# Patient Record
Sex: MALE | Race: BLACK OR AFRICAN AMERICAN | Employment: OTHER | URBAN - METROPOLITAN AREA
[De-identification: names, ages, dates, MRNs, and addresses within clinical notes are randomized per-mention and may not be internally consistent; named-entity substitution may affect disease eponyms.]

---

## 2017-10-15 ENCOUNTER — HOSPITAL ENCOUNTER (EMERGENCY)
Age: 37
Discharge: HOME | End: 2017-10-15
Attending: EMERGENCY MEDICINE
Payer: SELF-PAY

## 2017-10-15 ENCOUNTER — APPOINTMENT (OUTPATIENT)
Dept: ULTRASOUND IMAGING | Age: 37
End: 2017-10-15
Attending: EMERGENCY MEDICINE
Payer: SELF-PAY

## 2017-10-15 VITALS
SYSTOLIC BLOOD PRESSURE: 213 MMHG | HEIGHT: 71 IN | WEIGHT: 266.1 LBS | HEART RATE: 94 BPM | RESPIRATION RATE: 16 BRPM | OXYGEN SATURATION: 99 % | DIASTOLIC BLOOD PRESSURE: 126 MMHG | BODY MASS INDEX: 37.25 KG/M2

## 2017-10-15 DIAGNOSIS — N50.811 RIGHT TESTICULAR PAIN: Primary | ICD-10-CM

## 2017-10-15 LAB
ALBUMIN SERPL-MCNC: 3.8 G/DL (ref 3.5–5)
ALBUMIN/GLOB SERPL: 1.1 {RATIO} (ref 1.1–2.2)
ALP SERPL-CCNC: 54 U/L (ref 45–117)
ALT SERPL-CCNC: 22 U/L (ref 12–78)
ANION GAP SERPL CALC-SCNC: 8 MMOL/L (ref 5–15)
APPEARANCE UR: CLEAR
AST SERPL-CCNC: 14 U/L (ref 15–37)
BACTERIA URNS QL MICRO: NEGATIVE /HPF
BASOPHILS # BLD: 0 K/UL (ref 0–0.1)
BASOPHILS NFR BLD: 0 % (ref 0–1)
BILIRUB SERPL-MCNC: 0.3 MG/DL (ref 0.2–1)
BILIRUB UR QL: NEGATIVE
BUN SERPL-MCNC: 18 MG/DL (ref 6–20)
BUN/CREAT SERPL: 12 (ref 12–20)
CALCIUM SERPL-MCNC: 8.6 MG/DL (ref 8.5–10.1)
CHLORIDE SERPL-SCNC: 106 MMOL/L (ref 97–108)
CO2 SERPL-SCNC: 25 MMOL/L (ref 21–32)
COLOR UR: ABNORMAL
CREAT SERPL-MCNC: 1.48 MG/DL (ref 0.7–1.3)
EOSINOPHIL # BLD: 0.2 K/UL (ref 0–0.4)
EOSINOPHIL NFR BLD: 1 % (ref 0–7)
EPITH CASTS URNS QL MICRO: ABNORMAL /LPF
ERYTHROCYTE [DISTWIDTH] IN BLOOD BY AUTOMATED COUNT: 14.6 % (ref 11.5–14.5)
GLOBULIN SER CALC-MCNC: 3.6 G/DL (ref 2–4)
GLUCOSE SERPL-MCNC: 89 MG/DL (ref 65–100)
GLUCOSE UR STRIP.AUTO-MCNC: NEGATIVE MG/DL
HCT VFR BLD AUTO: 42.5 % (ref 36.6–50.3)
HGB BLD-MCNC: 13.8 G/DL (ref 12.1–17)
HGB UR QL STRIP: NEGATIVE
KETONES UR QL STRIP.AUTO: NEGATIVE MG/DL
LEUKOCYTE ESTERASE UR QL STRIP.AUTO: NEGATIVE
LYMPHOCYTES # BLD: 2.4 K/UL (ref 0.8–3.5)
LYMPHOCYTES NFR BLD: 23 % (ref 12–49)
MCH RBC QN AUTO: 28.6 PG (ref 26–34)
MCHC RBC AUTO-ENTMCNC: 32.5 G/DL (ref 30–36.5)
MCV RBC AUTO: 88.2 FL (ref 80–99)
MONOCYTES # BLD: 1.3 K/UL (ref 0–1)
MONOCYTES NFR BLD: 12 % (ref 5–13)
NEUTS SEG # BLD: 6.9 K/UL (ref 1.8–8)
NEUTS SEG NFR BLD: 64 % (ref 32–75)
NITRITE UR QL STRIP.AUTO: NEGATIVE
PH UR STRIP: 6 [PH] (ref 5–8)
PLATELET # BLD AUTO: 192 K/UL (ref 150–400)
POTASSIUM SERPL-SCNC: 3.8 MMOL/L (ref 3.5–5.1)
PROT SERPL-MCNC: 7.4 G/DL (ref 6.4–8.2)
PROT UR STRIP-MCNC: 30 MG/DL
RBC # BLD AUTO: 4.82 M/UL (ref 4.1–5.7)
RBC #/AREA URNS HPF: ABNORMAL /HPF (ref 0–5)
SODIUM SERPL-SCNC: 139 MMOL/L (ref 136–145)
SP GR UR REFRACTOMETRY: 1.03 (ref 1–1.03)
UA: UC IF INDICATED,UAUC: ABNORMAL
UROBILINOGEN UR QL STRIP.AUTO: 1 EU/DL (ref 0.2–1)
WBC # BLD AUTO: 10.8 K/UL (ref 4.1–11.1)
WBC URNS QL MICRO: ABNORMAL /HPF (ref 0–4)

## 2017-10-15 PROCEDURE — 99283 EMERGENCY DEPT VISIT LOW MDM: CPT

## 2017-10-15 PROCEDURE — 80053 COMPREHEN METABOLIC PANEL: CPT | Performed by: EMERGENCY MEDICINE

## 2017-10-15 PROCEDURE — 85025 COMPLETE CBC W/AUTO DIFF WBC: CPT | Performed by: EMERGENCY MEDICINE

## 2017-10-15 PROCEDURE — 81001 URINALYSIS AUTO W/SCOPE: CPT | Performed by: EMERGENCY MEDICINE

## 2017-10-15 PROCEDURE — 36415 COLL VENOUS BLD VENIPUNCTURE: CPT | Performed by: EMERGENCY MEDICINE

## 2017-10-15 PROCEDURE — 76870 US EXAM SCROTUM: CPT

## 2017-10-15 RX ORDER — HYDROCODONE BITARTRATE AND ACETAMINOPHEN 5; 325 MG/1; MG/1
1 TABLET ORAL
Qty: 12 TAB | Refills: 0 | Status: SHIPPED | OUTPATIENT
Start: 2017-10-15 | End: 2020-02-14

## 2017-10-15 NOTE — ED NOTES
Pt given discharge instructions by Dr. Colón . Discharged ambulatory with steady gait. No acute distress at time of discharge.

## 2017-10-15 NOTE — DISCHARGE INSTRUCTIONS
Testicular Pain: Care Instructions  Your Care Instructions    Pain in the testicles can be caused by many things. These include an injury to your testicles, an infection, and testicular torsion. Injuries and genital problems most often happen during sports or recreational activities, at work, or in a fall. Pain caused by an injury usually goes away quickly. There is usually no long-term harm to your testicles. Infections that may cause pain include:  · An infection of the testicles. This is called orchitis. · An abscess in the scrotum or testicles. · Some sexually transmitted infections (STIs). · A swelling of the tube attached to a testicle. This swelling is called epididymitis. It can cause pain and is sometimes caused by an infection. Testicular torsion happens when a testicle twists on the spermatic cord. This cuts off the blood supply to the testicle. This is a serious condition that requires surgery. Follow-up care is a key part of your treatment and safety. Be sure to make and go to all appointments, and call your doctor if you are having problems. It's also a good idea to know your test results and keep a list of the medicines you take. How can you care for yourself at home? · Rest and protect your testicles and groin. Stop, change, or take a break from any activity that may be causing your pain or soreness. · Put ice or a cold pack on the area for 10 to 20 minutes at a time. Put a thin cloth between the ice and your skin. · Wear briefs, not boxers. Briefs help support the injured area. You can use a jock strap if it helps relieve your pain. · If your doctor prescribed antibiotics, take them as directed. Do not stop taking them just because you feel better. You need to take the full course of antibiotics. · Ask your doctor if you can take an over-the-counter pain medicine, such as acetaminophen (Tylenol), ibuprofen (Advil, Motrin), or naproxen (Aleve). Be safe with medicines.  Read and follow all instructions on the label. · If the doctor gave you a prescription medicine for pain, take it as prescribed. When should you call for help? Call your doctor now or seek immediate medical care if:  · You have severe or increasing pain. · You notice a change in how your testicles look or are positioned in your scrotum. · You notice new or worse swelling in your scrotum. · You have symptoms of a urinary problem, such as a urinary tract infection. These may include:  ¨ Pain or burning when you urinate. ¨ A frequent need to urinate without being able to pass much urine. ¨ Pain in the flank, which is just below the rib cage and above the waist on either side of the back. ¨ Blood in your urine. ¨ A fever. Watch closely for changes in your health, and be sure to contact your doctor if:  · You do not get better as expected. Where can you learn more? Go to http://hardeep-ricki.info/. Enter D219 in the search box to learn more about \"Testicular Pain: Care Instructions. \"  Current as of: August 12, 2016  Content Version: 11.3  © 3544-3679 [employer]. Care instructions adapted under license by [employer] (which disclaims liability or warranty for this information). If you have questions about a medical condition or this instruction, always ask your healthcare professional. [address] any warranty or liability for your use of this information.

## 2017-10-15 NOTE — ED PROVIDER NOTES
[facility].  EMERGENCY DEPARTMENT HISTORY AND PHYSICAL EXAM       Date of Service: 10/15/2017   Patient Name: Selene Jesus   YOB: 1980  Medical Record Number: [id number]    History of Presenting Illness     Chief Complaint   Patient presents with    Groin Pain     patient reports right-sided groin pain since Friday. reports similar pain several months ago without dianosis        History Provided By:  patient    Additional History:   Selene Jesus is a 44 y.o. male with PMHx significant for HTN who presents ambulatory to the ED with cc of progressively worsening groin pain, greatest in his right testicle x 3 days. He also c/o associated suprapubic abdominal pain. He reports that he has relief in groin pain when he is standing up, but he notes it worsens when he is seated. He reports that he has a history of similar pain, and he followed up with Urology with no significant findings. He states that his current episode is much worse. He states that he is sexually active, and he has one partner. He denies any palpable masses. He specifically denies any testicular swelling, testicular erythema, dysuria, testicular discharge, hematuria, or other complaints at this time. Social Hx: + Tobacco (1 PPD), + EtOH, - Illicit Drugs    There are no other complaints, changes or physical findings at this time.     Primary Care Provider: None     Past History     Past Medical History:   Past Medical History:   Diagnosis Date    Elevated BP     Tobacco abuse         Past Surgical History:   Past Surgical History:   Procedure Laterality Date    HX ORTHOPAEDIC Left 1997    ankle        Family History:   Family History   Problem Relation Age of Onset    Hypertension Mother     Diabetes Father     Hypertension Sister     Hypertension Brother     Cancer Paternal Uncle     Cancer Paternal Grandfather         Social History:   Social History   Substance Use Topics    Smoking status: Current Every Day Smoker     Packs/day: 1.00     Years: 20.00    Smokeless tobacco: Never Used    Alcohol use 9.0 oz/week     12 Cans of beer, 6 Shots of liquor per week        Allergies:   No Known Allergies      Review of Systems   Review of Systems   Constitutional: Negative for fatigue and fever. HENT: Negative. Eyes: Negative. Respiratory: Negative for shortness of breath and wheezing. Cardiovascular: Negative for chest pain and leg swelling. Gastrointestinal: Positive for abdominal pain. Negative for blood in stool, constipation, diarrhea, nausea and vomiting. Endocrine: Negative. Genitourinary: Positive for testicular pain. Negative for difficulty urinating, discharge, dysuria, hematuria and scrotal swelling. Musculoskeletal: Negative. Skin: Negative for color change and rash. Allergic/Immunologic: Negative. Neurological: Negative for weakness and numbness. Hematological: Negative. Psychiatric/Behavioral: Negative. Physical Exam  Physical Exam   Constitutional: He is oriented to person, place, and time. He appears well-developed and well-nourished. No distress. HENT:   Head: Normocephalic and atraumatic. Mouth/Throat: Oropharynx is clear and moist.   Eyes: Conjunctivae and EOM are normal.   Neck: Neck supple. No JVD present. No tracheal deviation present. Cardiovascular: Normal rate, regular rhythm and intact distal pulses. Exam reveals no gallop and no friction rub. No murmur heard. Pulmonary/Chest: Effort normal and breath sounds normal. No stridor. No respiratory distress. He has no wheezes. Abdominal: Soft. Bowel sounds are normal. He exhibits no distension and no mass. There is no tenderness. There is no guarding. Genitourinary: Circumcised. Genitourinary Comments: TTP to right testicle; no swelling or palpable masses. Normal external genitalia. No inguinal lymphadenopathy, urethral discharge, or bleeding.    Musculoskeletal: Normal range of motion. He exhibits no edema or tenderness. No deformity   Neurological: He is alert and oriented to person, place, and time. He has normal strength. No focal deficits   Skin: Skin is warm, dry and intact. No rash noted. Psychiatric: He has a normal mood and affect. His behavior is normal. Judgment and thought content normal.   Nursing note and vitals reviewed. Medical Decision Making   I am the first provider for this patient. I reviewed the vital signs, available nursing notes, past medical history, past surgical history, family history and social history. Old Medical Records: Nursing notes. Provider Notes:   DDx: hydrocele, malignancy, epididymitis, cyst, uti. Will check labs, scrotal US. ED Course:  4:49 PM   Initial assessment performed. The patients presenting problems have been discussed, and they are in agreement with the care plan formulated and outlined with them. I have encouraged them to ask questions as they arise throughout their visit. Progress Note:  6:57 PM  The patient was re-evaluated, and he was informed of his lab and ultrasound results. He conveys his understanding of these results, and he will follow up with Urology. Written by ISHAN Walden, as dictated by [place] DO Austyn.     Diagnostic Study Results     Labs -      Recent Results (from the past 12 hour(s))   URINALYSIS W/ REFLEX CULTURE    Collection Time: 10/15/17  4:50 PM   Result Value Ref Range    Color YELLOW/STRAW      Appearance CLEAR CLEAR      Specific gravity 1.030 1.003 - 1.030      pH (UA) 6.0 5.0 - 8.0      Protein 30 (A) NEG mg/dL    Glucose NEGATIVE  NEG mg/dL    Ketone NEGATIVE  NEG mg/dL    Bilirubin NEGATIVE  NEG      Blood NEGATIVE  NEG      Urobilinogen 1.0 0.2 - 1.0 EU/dL    Nitrites NEGATIVE  NEG      Leukocyte Esterase NEGATIVE  NEG      WBC 0-4 0 - 4 /hpf    RBC 5-10 0 - 5 /hpf    Epithelial cells FEW FEW /lpf    Bacteria NEGATIVE  NEG /hpf    UA:UC IF INDICATED CULTURE NOT INDICATED BY UA RESULT CNI     METABOLIC PANEL, COMPREHENSIVE    Collection Time: 10/15/17  6:02 PM   Result Value Ref Range    Sodium 139 136 - 145 mmol/L    Potassium 3.8 3.5 - 5.1 mmol/L    Chloride 106 97 - 108 mmol/L    CO2 25 21 - 32 mmol/L    Anion gap 8 5 - 15 mmol/L    Glucose 89 65 - 100 mg/dL    BUN 18 6 - 20 MG/DL    Creatinine 1.48 (H) 0.70 - 1.30 MG/DL    BUN/Creatinine ratio 12 12 - 20      GFR est AA >60 >60 ml/min/1.73m2    GFR est non-AA 53 (L) >60 ml/min/1.73m2    Calcium 8.6 8.5 - 10.1 MG/DL    Bilirubin, total 0.3 0.2 - 1.0 MG/DL    ALT (SGPT) 22 12 - 78 U/L    AST (SGOT) 14 (L) 15 - 37 U/L    Alk. phosphatase 54 45 - 117 U/L    Protein, total 7.4 6.4 - 8.2 g/dL    Albumin 3.8 3.5 - 5.0 g/dL    Globulin 3.6 2.0 - 4.0 g/dL    A-G Ratio 1.1 1.1 - 2.2     CBC WITH AUTOMATED DIFF    Collection Time: 10/15/17  6:02 PM   Result Value Ref Range    WBC 10.8 4.1 - 11.1 K/uL    RBC 4.82 4.10 - 5.70 M/uL    HGB 13.8 12.1 - 17.0 g/dL    HCT 42.5 36.6 - 50.3 %    MCV 88.2 80.0 - 99.0 FL    MCH 28.6 26.0 - 34.0 PG    MCHC 32.5 30.0 - 36.5 g/dL    RDW 14.6 (H) 11.5 - 14.5 %    PLATELET 350 091 - 501 K/uL    NEUTROPHILS 64 32 - 75 %    LYMPHOCYTES 23 12 - 49 %    MONOCYTES 12 5 - 13 %    EOSINOPHILS 1 0 - 7 %    BASOPHILS 0 0 - 1 %    ABS. NEUTROPHILS 6.9 1.8 - 8.0 K/UL    ABS. LYMPHOCYTES 2.4 0.8 - 3.5 K/UL    ABS. MONOCYTES 1.3 (H) 0.0 - 1.0 K/UL    ABS. EOSINOPHILS 0.2 0.0 - 0.4 K/UL    ABS. BASOPHILS 0.0 0.0 - 0.1 K/UL       Radiologic Studies -  The following have been ordered and reviewed:   EXAM:  US SCROTUM/TESTICLES      INDICATION: Right testicular pain since Friday.     COMPARISON: None.     TECHNIQUE:  Real-time sonography of the scrotum was performed with a high frequency linear  transducer. Multiple static images were obtained.  Color Doppler evaluation was  also performed.     FINDINGS:  RIGHT TESTICLE: The right testis measures 3.9 x 2.2 x 2.8 cm in size cm and the  right testicular volume is 12.5 mL. There is an irregular hypoechoic rounded  area in the anteromedial aspect of the right testis measuring 1.4 x 1.2 x 1.7 cm  in size. There is flow demonstrated in the right testis except in the area of  hypoattenuation.     RIGHT EPIDIDYMIS: The right epididymis is normal.     LEFT TESTICLE: The left testicle is normal in size measuring 4.3 x 1.7 x 2.8 cm  and the left testicular volume is 10.3 mL. Several tiny foci of anechoic texture  shown in the left testis consistent with cysts.     LEFT EPIDIDYMIS: The left epididymis is normal.     HYDROCELE: No hydrocele demonstrated.     IMPRESSION  IMPRESSION: Of nonspecific rounded area of diminished echotexture in the  anteromedial right testis without flow. Finding of uncertain significance. Neoplastic origin not excluded.               Vital Signs-Reviewed the patient's vital signs. Patient Vitals for the past 12 hrs:   Pulse Resp BP SpO2   10/15/17 1543 94 16 (!) 213/126 99 %       Medications Given in the ED:  Medications - No data to display    Diagnosis   Clinical Impression:   1. Right testicular pain         Plan:  1:   Follow-up Information     Follow up With Details Comments [facility] Schedule an appointment as soon as possible for a visit in 1 day  [facility] EMERGENCY DEPT  As needed, If symptoms worsen [address] [place] [address]  [phone]        2:   Current Discharge Medication List        Return to ED if Worse    Disposition Note:  Discharge Note:  6:57 PM  The pt is ready for discharge. The pt's signs, symptoms, diagnosis, and discharge instructions have been discussed and pt has conveyed their understanding. The pt is to follow up as recommended or return to ER should their symptoms worsen. Plan has been discussed and pt is in agreement.      This note is prepared by Juanito Carnes, acting as a Scribe for [employer]. Rakesh Grigsby DO. Xiao Hardy, DO: The scribe's documentation has been prepared under my direction and personally reviewed by me in its entirety. I confirm that the notes above accurately reflects all work, treatment, procedures, and medical decision making performed by me. This note will not be viewable in [address]

## 2020-02-14 ENCOUNTER — OFFICE VISIT (OUTPATIENT)
Dept: FAMILY MEDICINE CLINIC | Age: 40
End: 2020-02-14

## 2020-02-14 VITALS
DIASTOLIC BLOOD PRESSURE: 119 MMHG | HEART RATE: 96 BPM | WEIGHT: 270 LBS | RESPIRATION RATE: 16 BRPM | SYSTOLIC BLOOD PRESSURE: 170 MMHG | BODY MASS INDEX: 37.8 KG/M2 | HEIGHT: 71 IN | TEMPERATURE: 98.6 F | OXYGEN SATURATION: 97 %

## 2020-02-14 DIAGNOSIS — Z72.0 TOBACCO ABUSE: ICD-10-CM

## 2020-02-14 DIAGNOSIS — Z00.00 WELLNESS EXAMINATION: Primary | ICD-10-CM

## 2020-02-14 DIAGNOSIS — Z00.00 ENCOUNTER FOR MEDICAL EXAMINATION TO ESTABLISH CARE: ICD-10-CM

## 2020-02-14 DIAGNOSIS — I10 ESSENTIAL HYPERTENSION: ICD-10-CM

## 2020-02-14 DIAGNOSIS — E66.01 SEVERE OBESITY (HCC): ICD-10-CM

## 2020-02-14 DIAGNOSIS — Z13.220 SCREENING, LIPID: ICD-10-CM

## 2020-02-14 DIAGNOSIS — Z13.29 SCREENING FOR THYROID DISORDER: ICD-10-CM

## 2020-02-14 DIAGNOSIS — E55.9 VITAMIN D DEFICIENCY: ICD-10-CM

## 2020-02-14 RX ORDER — LOSARTAN POTASSIUM 100 MG/1
100 TABLET ORAL DAILY
Qty: 90 TAB | Refills: 1 | Status: SHIPPED | OUTPATIENT
Start: 2020-02-14 | End: 2020-05-14 | Stop reason: SDUPTHER

## 2020-02-14 RX ORDER — VARENICLINE TARTRATE 25 MG
KIT ORAL
Qty: 1 DOSE PACK | Refills: 0 | Status: SHIPPED | OUTPATIENT
Start: 2020-02-14 | End: 2020-03-20 | Stop reason: DRUGHIGH

## 2020-02-14 NOTE — PROGRESS NOTES
Chief Complaint   Patient presents with   [address]    Well Male         S: Pat Speaker is a 35 y.o. male who presents for wellness physical and her to establish care. Used to be seen by PCP here several years ago. Pt is well, has no complaints at this time and denies any recent illness. There are no issues which need to be addressed today. Has history of HTN, not on medication, says he never really took the medicine. Says that he was worried about erectile dysfunction. Has no complaints of headaches, vision changes, swelling, chest pain, dizziness    Denies any systemic symptoms including fever, myalgias, chills, weakness, weight loss and fatigue. Denies respiratory symptoms including cough, SOB, or wheezing. Denies any cardiac symptoms including chest pain, tightness or discomfort or syncope. ROS is otherwise negative. Nutrition: not really following diet. He is not fasting today. Physical: no formal exercise, . Wants to set up gym at home  Social: Denies any recent stressors. Tobacco: +smoking 1 pack per day. Alcohol: Denies alcohol use    Past Medical History:   Diagnosis Date    Elevated BP     Tobacco abuse      Past Surgical History:   Procedure Laterality Date    HX ORTHOPAEDIC Left 1997    ankle     Social History     Socioeconomic History    Marital status:      Spouse name: Not on file    Number of children: Not on file    Years of education: Not on file    Highest education level: Not on file   Occupational History    Not on file   Social Needs    Financial resource strain: Not on file    Food insecurity:     Worry: Not on file     Inability: Not on file    Transportation needs:     Medical: Not on file     Non-medical: Not on file   Tobacco Use    Smoking status: Current Every Day Smoker     Packs/day: 1.00     Years: 20.00     Pack years: 20.00    Smokeless tobacco: Never Used   Substance and Sexual Activity    Alcohol use:  Yes Alcohol/week: 15.0 standard drinks     Types: 12 Cans of beer, 6 Shots of liquor per week     Comment: socially on weekend    Drug use: No    Sexual activity: Yes     Partners: Female     Birth control/protection: None   Lifestyle    Physical activity:     Days per week: Not on file     Minutes per session: Not on file    Stress: Not on file   Relationships    Social connections:     Talks on phone: Not on file     Gets together: Not on file     Attends Mu-ism service: Not on file     Active member of club or organization: Not on file     Attends meetings of clubs or organizations: Not on file     Relationship status: Not on file    Intimate partner violence:     Fear of current or ex partner: Not on file     Emotionally abused: Not on file     Physically abused: Not on file     Forced sexual activity: Not on file   Other Topics Concern    Not on file   Social History Narrative    Lives with Fiance. 3 kids 25, 12, and 6.  3 stepkids 18,11, and 7. [employer]. Current Outpatient Medications   Medication Sig Dispense Refill    HYDROcodone-acetaminophen (NORCO) 5-325 mg per tablet Take 1 Tab by mouth every four (4) hours as needed for Pain. Max Daily Amount: 6 Tabs. 12 Tab 0    losartan (COZAAR) 100 mg tablet Take 100 mg by mouth daily. Pt is taking all medications as prescribed without any side effects or difficulty. No Known Allergies          Agree with nurses note. O: VS:   Visit Vitals  BP (!) 170/119 (BP 1 Location: Left arm, BP Patient Position: Sitting)   Pulse 96   Temp 98.6 °F (37 °C) (Oral)   Resp 16   Ht 5' 11\" (1.803 m)   Wt 270 lb (122.5 kg)   SpO2 97%   BMI 37.66 kg/m²     PAIN: No complaints of pain today. GENERAL: Gabrielle Parrish is sitting on the table in no acute distress. Non-toxic. Well nourished. Well developed. Appropriately groomed. He is friendly, social and cooperative. HEAD:  Normocephalic. Atraumatic. Non tender sinuses x 4. EYE: PERRLA. EOMs intact. Sclera anicteric without injection. No drainage or discharge. EARS: Hearing intact bilaterally. External ear canals normal without evidence of blood or swelling. Bilateral TM's intact, pearly grey with landmarks visible. No erythema or effusion. NOSE: Patent. Nasal turbinates pink. No polyps noted. No erythema. No discharge. MOUTH: mucous membranes pink and moist. Posterior pharynx normal with cobblestone appearance. No erythema, white exudate or obstruction. NECK: supple. Midline trachea. No carotid bruits noted bilaterally. No thyromegaly noted. RESP: Breath sounds are symmetrical bilaterally. Unlabored without SOB. Speaking in full sentences. Clear to auscultation bilaterally anteriorly and posteriorly. No wheezes. No rales or rhonchi. CV: normal rate. Regular rhythm. S1, S2 audible. No murmur noted. No rubs, clicks or gallops noted. ABDOMEN: Flat without bulges or pulsations. Soft and nondistended. No tenderness on palpation. No masses or organomegaly. No rebound, rigidity or guarding. Bowel sounds normal x 4 quadrants. BACK: No visible deformities or curvature. Full ROM. No pain on palpation of the spinous processes in the cervical, thoracic, lumbar, sacral regions. No CVA tenderness. NEURO:  awake, alert and oriented to person, place, and time and event. Cranial nerves II through XII intact. Clear speech. Muscle strength is +5/5 x 4 extremities. Sensation is intact to light touch bilaterally. Steady gait. MUSC:  Intact x 4 extremities. Full ROM x 4 extremities. No pain with movement. HEME/LYMPH: peripheral pulses palpable 2+ x 4 extremities. No peripheral edema is noted. No cervical adenopathy noted. SKIN: clean dry and intact throughout. No rashes, erythema, ecchymosis, lacerations, abrasions, suspicious moles noted. PSYCH: appropriate behavior, dress and thought processes. Good eye contact. Clear and coherent speech. Full affect. Good insight.      A/P: Differential diagnosis and treatment options reviewed with patient who is in agreement with treatment plan as outlined below. ICD-10-CM ICD-9-CM    1. Wellness examination U32.99 B79.5 METABOLIC PANEL, COMPREHENSIVE      CBC WITH AUTOMATED DIFF   2. Severe obesity ([address] E66.01 278.01    3. Essential hypertension M55 005.8 METABOLIC PANEL, COMPREHENSIVE      losartan (COZAAR) 100 mg tablet   4. Tobacco abuse Z72.0 305.1 varenicline (CHANTIX STARTER MAISHA) 0.5 mg (11)- 1 mg (42) DsPk   5. Encounter for medical examination to establish care Z00.00 V70.9    6. Vitamin D deficiency E55.9 268.9 VITAMIN D, 25 HYDROXY   7. Screening, lipid Z13.220 V77.91 LIPID PANEL   8. Screening for thyroid disorder Z13.29 V77.0 TSH 3RD GENERATION     Elevated BP. Will restart losartan. DASH diet discussed and encouraged  Not fasting today. Will get fasting labs done at [address] [place] [address].    Smoking cessation discussed, will start on chantix. Medication profile discussed. The addictiveness of nicotine is the primary barrier for smokers to quit. Nicotine is a potent psychoactive drug that causes physical dependence and tolerance. In the absence of nicotine, a smoker develops cravings for cigarettes and symptoms of the nicotine withdrawal syndrome. Symptoms peak in the first three days of smoking cessation and subside over the next three to four weeks. Symptoms include increased appetite and weight gain, changes in mood (dysphoria or depression), insomnia, irritability, anxiety, difficulty concentrating, and restlessness    Most studies demonstrate increasing quit rates with increasing behavioral support. Offered resource to patient -free telephone quitline (9-609-QUITNOW) that will provide follow-up support and counseling to supplement the brief clinician intervention. Other behavioral counseling options include computer programs, text messaging, web-based interventions, and phone apps. Discussed BMI and healthy weight.  Encouraged patient to work to implement changes including diet high in raw fruits and vegetables, lean protein and good fats. Limit refined, processed carbohydrates and sugar. Encouraged regular exercise. Advised on nutrition, physical activity, weight management, tobacco, alcohol and safety      Verbal and written instructions (see AVS) provided. Patient expresses understanding and agreement of diagnosis and treatment plan.

## 2020-02-14 NOTE — PATIENT INSTRUCTIONS
DASH Diet: Care Instructions Your Care Instructions The DASH diet is an eating plan that can help lower your blood pressure. DASH stands for Dietary Approaches to Stop Hypertension. Hypertension is high blood pressure. The DASH diet focuses on eating foods that are high in calcium, potassium, and magnesium. These nutrients can lower blood pressure. The foods that are highest in these nutrients are fruits, vegetables, low-fat dairy products, nuts, seeds, and legumes. But taking calcium, potassium, and magnesium supplements instead of eating foods that are high in those nutrients does not have the same effect. The DASH diet also includes whole grains, fish, and poultry. The DASH diet is one of several lifestyle changes your doctor may recommend to lower your high blood pressure. Your doctor may also want you to decrease the amount of sodium in your diet. Lowering sodium while following the DASH diet can lower blood pressure even further than just the DASH diet alone. Follow-up care is a key part of your treatment and safety. Be sure to make and go to all appointments, and call your doctor if you are having problems. It's also a good idea to know your test results and keep a list of the medicines you take. How can you care for yourself at home? Following the DASH diet · Eat 4 to 5 servings of fruit each day. A serving is 1 medium-sized piece of fruit, ½ cup chopped or canned fruit, 1/4 cup dried fruit, or 4 ounces (½ cup) of fruit juice. Choose fruit more often than fruit juice. · Eat 4 to 5 servings of vegetables each day. A serving is 1 cup of lettuce or raw leafy vegetables, ½ cup of chopped or cooked vegetables, or 4 ounces (½ cup) of vegetable juice. Choose vegetables more often than vegetable juice. · Get 2 to 3 servings of low-fat and fat-free dairy each day. A serving is 8 ounces of milk, 1 cup of yogurt, or 1 ½ ounces of cheese. · Eat 6 to 8 servings of grains each day. A serving is 1 slice of bread, 1 ounce of dry cereal, or ½ cup of cooked rice, pasta, or cooked cereal. Try to choose whole-grain products as much as possible. · Limit lean meat, poultry, and fish to 2 servings each day. A serving is 3 ounces, about the size of a deck of cards. · Eat 4 to 5 servings of nuts, seeds, and legumes (cooked dried beans, lentils, and split peas) each week. A serving is 1/3 cup of nuts, 2 tablespoons of seeds, or ½ cup of cooked beans or peas. · Limit fats and oils to 2 to 3 servings each day. A serving is 1 teaspoon of vegetable oil or 2 tablespoons of salad dressing. · Limit sweets and added sugars to 5 servings or less a week. A serving is 1 tablespoon jelly or jam, ½ cup sorbet, or 1 cup of lemonade. · Eat less than 2,300 milligrams (mg) of sodium a day. If you limit your sodium to 1,500 mg a day, you can lower your blood pressure even more. Tips for success · Start small. Do not try to make dramatic changes to your diet all at once. You might feel that you are missing out on your favorite foods and then be more likely to not follow the plan. Make small changes, and stick with them. Once those changes become habit, add a few more changes. · Try some of the following: ? Make it a goal to eat a fruit or vegetable at every meal and at snacks. This will make it easy to get the recommended amount of fruits and vegetables each day. ? Try yogurt topped with fruit and nuts for a snack or healthy dessert. ? Add lettuce, tomato, cucumber, and onion to sandwiches. ? Combine a ready-made pizza crust with low-fat mozzarella cheese and lots of vegetable toppings. Try using tomatoes, squash, spinach, broccoli, carrots, cauliflower, and onions. ? Have a variety of cut-up vegetables with a low-fat dip as an appetizer instead of chips and dip. ? Sprinkle sunflower seeds or chopped almonds over salads.  Or try adding chopped walnuts or almonds to cooked vegetables. ? Try some vegetarian meals using beans and peas. Add garbanzo or kidney beans to salads. Make burritos and tacos with mashed wilson beans or black beans. Where can you learn more? Go to http://hardeep-ricki.info/. Enter A598 in the search box to learn more about \"DASH Diet: Care Instructions. \" Current as of: April 9, 2019 Content Version: 12.2 © 9814-8326 [employer]. Care instructions adapted under license by [employer] (which disclaims liability or warranty for this information). If you have questions about a medical condition or this instruction, always ask your healthcare professional. [address] any warranty or liability for your use of this information. Well Visit, Ages 25 to 48: Care Instructions Your Care Instructions Physical exams can help you stay healthy. Your doctor has checked your overall health and may have suggested ways to take good care of yourself. He or she also may have recommended tests. At home, you can help prevent illness with healthy eating, regular exercise, and other steps. Follow-up care is a key part of your treatment and safety. Be sure to make and go to all appointments, and call your doctor if you are having problems. It's also a good idea to know your test results and keep a list of the medicines you take. How can you care for yourself at home? · Reach and stay at a healthy weight. This will lower your risk for many problems, such as obesity, diabetes, heart disease, and high blood pressure. · Get at least 30 minutes of physical activity on most days of the week. Walking is a good choice. You also may want to do other activities, such as running, swimming, cycling, or playing tennis or team sports. Discuss any changes in your exercise program with your doctor. · Do not smoke or allow others to smoke around you.  If you need help quitting, talk to your doctor about stop-smoking programs and medicines. These can increase your chances of quitting for good. · Talk to your doctor about whether you have any risk factors for sexually transmitted infections (STIs). Having one sex partner (who does not have STIs and does not have sex with anyone else) is a good way to avoid these infections. · Use birth control if you do not want to have children at this time. Talk with your doctor about the choices available and what might be best for you. · Protect your skin from too much sun. When you're outdoors from 10 a.m. to 4 p.m., stay in the shade or cover up with clothing and a hat with a wide brim. Wear sunglasses that block UV rays. Even when it's cloudy, put broad-spectrum sunscreen (SPF 30 or higher) on any exposed skin. · See a dentist one or two times a year for checkups and to have your teeth cleaned. · Wear a seat belt in the car. Follow your doctor's advice about when to have certain tests. These tests can spot problems early. For everyone · Cholesterol. Have the fat (cholesterol) in your blood tested after age 21. Your doctor will tell you how often to have this done based on your age, family history, or other things that can increase your risk for heart disease. · Blood pressure. Have your blood pressure checked during a routine doctor visit. Your doctor will tell you how often to check your blood pressure based on your age, your blood pressure results, and other factors. · Vision. Talk with your doctor about how often to have a glaucoma test. 
· Diabetes. Ask your doctor whether you should have tests for diabetes. · Colon cancer. Your risk for colorectal cancer gets higher as you get older. Some experts say that adults should start regular screening at age 48 and stop at age 76. Others say to start before age 48 or continue after age 76. Talk with your doctor about your risk and when to start and stop screening. For women · Breast exam and mammogram. Talk to your doctor about when you should have a clinical breast exam and a mammogram. Medical experts differ on whether and how often women under 50 should have these tests. Your doctor can help you decide what is right for you. · Cervical cancer screening test and pelvic exam. Begin with a Pap test at age 24. The test often is part of a pelvic exam. Starting at age 27, you may choose to have a Pap test, an HPV test, or both tests at the same time (called co-testing). Talk with your doctor about how often to have testing. · Tests for sexually transmitted infections (STIs). Ask whether you should have tests for STIs. You may be at risk if you have sex with more than one person, especially if your partners do not wear condoms. For men · Tests for sexually transmitted infections (STIs). Ask whether you should have tests for STIs. You may be at risk if you have sex with more than one person, especially if you do not wear a condom. · Testicular cancer exam. Ask your doctor whether you should check your testicles regularly. · Prostate exam. Talk to your doctor about whether you should have a blood test (called a PSA test) for prostate cancer. Experts differ on whether and when men should have this test. Some experts suggest it if you are older than 39 and are -American or have a father or brother who got prostate cancer when he was younger than 72. When should you call for help? Watch closely for changes in your health, and be sure to contact your doctor if you have any problems or symptoms that concern you. Where can you learn more? Go to http://hardeep-ricki.info/. Enter P072 in the search box to learn more about \"Well Visit, Ages 25 to 48: Care Instructions. \" Current as of: December 13, 2018 Content Version: 12.2 © 2345-9931 [employer].  Care instructions adapted under license by [address] (which disclaims liability or warranty for this information). If you have questions about a medical condition or this instruction, always ask your healthcare professional. [address] any warranty or liability for your use of this information. Learning About Benefits From Quitting Smoking How does quitting smoking make you healthier? If you're thinking about quitting smoking, you may have a few reasons to be smoke-free. Your health may be one of them. · When you quit smoking, you lower your risks for cancer, lung disease, heart attack, stroke, blood vessel disease, and blindness from macular degeneration. · When you're smoke-free, you get sick less often, and you heal faster. You are less likely to get colds, flu, bronchitis, and pneumonia. · As a nonsmoker, you may find that your mood is better and you are less stressed. When and how will you feel healthier? Quitting has real health benefits that start from day 1 of being smoke-free. And the longer you stay smoke-free, the healthier you get and the better you feel. The first hours · After just 20 minutes, your blood pressure and heart rate go down. That means there's less stress on your heart and blood vessels. · Within 12 hours, the level of carbon monoxide in your blood drops back to normal. That makes room for more oxygen. With more oxygen in your body, you may notice that you have more energy than when you smoked. After 2 weeks · Your lungs start to work better. · Your risk of heart attack starts to drop. After 1 month · When your lungs are clear, you cough less and breathe deeper, so it's easier to be active. · Your sense of taste and smell return. That means you can enjoy food more than you have since you started smoking. Over the years · After 1 year, your risk of heart disease is half what it would be if you kept smoking. · After 5 years, your risk of stroke starts to shrink. Within a few years after that, it's about the same as if you'd never smoked. · After 10 years, your risk of dying from lung cancer is cut by about half. And your risk for many other types of cancer is lower too. How would quitting help others in your life? When you quit smoking, you improve the health of everyone who now breathes in your smoke. · Their heart, lung, and cancer risks drop, much like yours. · They are sick less. For babies and small children, living smoke-free means they're less likely to have ear infections, pneumonia, and bronchitis. · If you're a woman who is or will be pregnant someday, quitting smoking means a healthier . · Children who are close to you are less likely to become adult smokers. Where can you learn more? Go to http://hardeep-ricki.info/. Enter [id number] [id number] [id number] in the search box to learn more about \"Learning About Benefits From Quitting Smoking. \" Current as of: 2018 Content Version: 12.2 © 9489-4443 [employer]. Care instructions adapted under license by [employer] (which disclaims liability or warranty for this information). If you have questions about a medical condition or this instruction, always ask your healthcare professional. [address] any warranty or liability for your use of this information.

## 2020-02-14 NOTE — PROGRESS NOTES
Chief Complaint   Patient presents with   [address] Well Male     1. Have you been to the ER, urgent care clinic since your last visit? Hospitalized since your last visit?n/a np    2. Have you seen or consulted any other health care providers outside of the [address] [place] [address] since your last visit? Include any pap smears or colon screening. n/a np    Health maintenance reviewed. Pt informed of health maintenance past due and/or upcoming. Pt verbalized understanding. Health Maintenance Due   Topic Date Due    Pneumococcal 0-64 years (1 of 1 - PPSV23) 08/23/1985    DTaP/Tdap/Td series (1 - Tdap) 08/23/1990    Influenza Age 5 to Adult  08/01/2019    Lipid Screen  08/23/2019     Pt used to come here long time ago, but hasn't been anywhere since.

## 2020-03-20 DIAGNOSIS — Z72.0 TOBACCO ABUSE: ICD-10-CM

## 2020-03-20 RX ORDER — VARENICLINE TARTRATE 25 MG
KIT ORAL
Qty: 1 DOSE PACK | Refills: 0 | Status: CANCELLED | OUTPATIENT
Start: 2020-03-20

## 2020-03-20 RX ORDER — VARENICLINE TARTRATE 1 MG/1
1 TABLET, FILM COATED ORAL 2 TIMES DAILY
Qty: 60 TAB | Refills: 1 | Status: SHIPPED | OUTPATIENT
Start: 2020-03-20 | End: 2020-08-19 | Stop reason: ALTCHOICE

## 2020-03-20 NOTE — TELEPHONE ENCOUNTER
Message from [employer]    Np polo/Telephone/refill   Received: Today   Message Contents   [facility] [address] [facility] Office Pool             Pt needs a refill chantix and also wanted to speak with you about the bp medication.  Best contact number is [phone].       Requested Prescriptions     Pending Prescriptions Disp Refills    varenicline (CHANTIX STARTER MAISHA) 0.5 mg (11)- 1 mg (42) DsPk 1 Dose Pack 0     Sig: Per pack instructions

## 2020-03-27 DIAGNOSIS — Z72.0 TOBACCO ABUSE: ICD-10-CM

## 2020-03-27 RX ORDER — VARENICLINE TARTRATE 1 MG/1
1 TABLET, FILM COATED ORAL 2 TIMES DAILY
Qty: 60 TAB | Refills: 1 | OUTPATIENT
Start: 2020-03-27

## 2020-03-27 NOTE — TELEPHONE ENCOUNTER
Called pt and notified him that rx was called in to the pharmacy last Friday. He stated understanding. He wanted to notify Vaishali that the bp medication she put him on is working well.

## 2020-03-27 NOTE — TELEPHONE ENCOUNTER
I sent this a week ago.     Not sure what he needs to talk to me about but could schedule a virtual if needed for next week

## 2020-03-27 NOTE — TELEPHONE ENCOUNTER
Patient calling in for refill of Chantix. He says nikhil the starter kit worked very well and needs whatever the next step is with chantix.      He would like to speak to Chelsea as well, cell number is [phone]

## 2020-03-30 ENCOUNTER — HOSPITAL ENCOUNTER (INPATIENT)
Age: 40
LOS: 2 days | Discharge: HOME | DRG: 281 | End: 2020-04-02
Attending: EMERGENCY MEDICINE | Admitting: HOSPITALIST
Payer: COMMERCIAL

## 2020-03-30 DIAGNOSIS — I10 UNCONTROLLED HYPERTENSION: ICD-10-CM

## 2020-03-30 DIAGNOSIS — I24.9 ACS (ACUTE CORONARY SYNDROME) (HCC): ICD-10-CM

## 2020-03-30 DIAGNOSIS — I21.4 NSTEMI (NON-ST ELEVATED MYOCARDIAL INFARCTION) (HCC): Primary | ICD-10-CM

## 2020-03-30 PROCEDURE — 96365 THER/PROPH/DIAG IV INF INIT: CPT

## 2020-03-30 PROCEDURE — 93005 ELECTROCARDIOGRAM TRACING: CPT

## 2020-03-30 PROCEDURE — 96375 TX/PRO/DX INJ NEW DRUG ADDON: CPT

## 2020-03-30 PROCEDURE — 99285 EMERGENCY DEPT VISIT HI MDM: CPT

## 2020-03-30 NOTE — Clinical Note
TRANSFER - OUT REPORT:     Verbal report given to: [place]. Report consisted of patient's Situation, Background, Assessment and   Recommendations(SBAR). Opportunity for questions and clarification was provided. Patient transported with a Registered Nurse.

## 2020-03-31 PROBLEM — E66.9 CLASS 2 OBESITY IN ADULT: Status: ACTIVE | Noted: 2020-03-31

## 2020-03-31 PROBLEM — I21.4 NSTEMI (NON-ST ELEVATED MYOCARDIAL INFARCTION) (HCC): Status: ACTIVE | Noted: 2020-03-31

## 2020-03-31 PROBLEM — Z82.49 FAMILY HISTORY OF EARLY CAD: Status: ACTIVE | Noted: 2020-03-31

## 2020-03-31 PROBLEM — N17.9 ACUTE RENAL FAILURE (HCC): Status: ACTIVE | Noted: 2020-03-31

## 2020-03-31 LAB
ALBUMIN SERPL-MCNC: 3.7 G/DL (ref 3.5–5)
ALBUMIN/GLOB SERPL: 0.9 {RATIO} (ref 1.1–2.2)
ALP SERPL-CCNC: 52 U/L (ref 45–117)
ALT SERPL-CCNC: 33 U/L (ref 12–78)
ANION GAP SERPL CALC-SCNC: 4 MMOL/L (ref 5–15)
APTT PPP: 40.6 SEC (ref 22.1–32)
APTT PPP: 41.9 SEC (ref 22.1–32)
AST SERPL-CCNC: 30 U/L (ref 15–37)
ATRIAL RATE: 82 BPM
ATRIAL RATE: 83 BPM
BASOPHILS # BLD: 0.1 K/UL (ref 0–0.1)
BASOPHILS NFR BLD: 1 % (ref 0–1)
BILIRUB SERPL-MCNC: 0.2 MG/DL (ref 0.2–1)
BUN SERPL-MCNC: 27 MG/DL (ref 6–20)
BUN/CREAT SERPL: 14 (ref 12–20)
CALCIUM SERPL-MCNC: 8.7 MG/DL (ref 8.5–10.1)
CALCULATED P AXIS, ECG09: 48 DEGREES
CALCULATED P AXIS, ECG09: 51 DEGREES
CALCULATED R AXIS, ECG10: -23 DEGREES
CALCULATED R AXIS, ECG10: -24 DEGREES
CALCULATED T AXIS, ECG11: 127 DEGREES
CALCULATED T AXIS, ECG11: 137 DEGREES
CHLORIDE SERPL-SCNC: 107 MMOL/L (ref 97–108)
CHOLEST SERPL-MCNC: 133 MG/DL
CO2 SERPL-SCNC: 28 MMOL/L (ref 21–32)
CREAT SERPL-MCNC: 1.99 MG/DL (ref 0.7–1.3)
DIAGNOSIS, 93000: NORMAL
DIAGNOSIS, 93000: NORMAL
DIFFERENTIAL METHOD BLD: ABNORMAL
EOSINOPHIL # BLD: 0.3 K/UL (ref 0–0.4)
EOSINOPHIL NFR BLD: 3 % (ref 0–7)
ERYTHROCYTE [DISTWIDTH] IN BLOOD BY AUTOMATED COUNT: 13.9 % (ref 11.5–14.5)
EST. AVERAGE GLUCOSE BLD GHB EST-MCNC: 117 MG/DL
GLOBULIN SER CALC-MCNC: 4 G/DL (ref 2–4)
GLUCOSE SERPL-MCNC: 95 MG/DL (ref 65–100)
HBA1C MFR BLD: 5.7 % (ref 4–5.6)
HCT VFR BLD AUTO: 40.5 % (ref 36.6–50.3)
HDLC SERPL-MCNC: 44 MG/DL
HDLC SERPL: 3 {RATIO} (ref 0–5)
HGB BLD-MCNC: 13.3 G/DL (ref 12.1–17)
IMM GRANULOCYTES # BLD AUTO: 0 K/UL (ref 0–0.04)
IMM GRANULOCYTES NFR BLD AUTO: 0 % (ref 0–0.5)
INR PPP: 1 (ref 0.9–1.1)
LDLC SERPL CALC-MCNC: 71.4 MG/DL (ref 0–100)
LIPID PROFILE,FLP: NORMAL
LYMPHOCYTES # BLD: 3.2 K/UL (ref 0.8–3.5)
LYMPHOCYTES NFR BLD: 38 % (ref 12–49)
MCH RBC QN AUTO: 29.1 PG (ref 26–34)
MCHC RBC AUTO-ENTMCNC: 32.8 G/DL (ref 30–36.5)
MCV RBC AUTO: 88.6 FL (ref 80–99)
MONOCYTES # BLD: 1.3 K/UL (ref 0–1)
MONOCYTES NFR BLD: 16 % (ref 5–13)
NEUTS SEG # BLD: 3.4 K/UL (ref 1.8–8)
NEUTS SEG NFR BLD: 42 % (ref 32–75)
NRBC # BLD: 0 K/UL (ref 0–0.01)
NRBC BLD-RTO: 0 PER 100 WBC
P-R INTERVAL, ECG05: 188 MS
P-R INTERVAL, ECG05: 190 MS
PLATELET # BLD AUTO: 146 K/UL (ref 150–400)
PLATELET COMMENTS,PCOM: ABNORMAL
PMV BLD AUTO: 11.5 FL (ref 8.9–12.9)
POTASSIUM SERPL-SCNC: 3.8 MMOL/L (ref 3.5–5.1)
PROT SERPL-MCNC: 7.7 G/DL (ref 6.4–8.2)
PROTHROMBIN TIME: 10.6 SEC (ref 9–11.1)
Q-T INTERVAL, ECG07: 408 MS
Q-T INTERVAL, ECG07: 424 MS
QRS DURATION, ECG06: 110 MS
QRS DURATION, ECG06: 118 MS
QTC CALCULATION (BEZET), ECG08: 476 MS
QTC CALCULATION (BEZET), ECG08: 498 MS
RBC # BLD AUTO: 4.57 M/UL (ref 4.1–5.7)
RBC MORPH BLD: ABNORMAL
SODIUM SERPL-SCNC: 139 MMOL/L (ref 136–145)
THERAPEUTIC RANGE,PTTT: ABNORMAL SECS (ref 58–77)
THERAPEUTIC RANGE,PTTT: ABNORMAL SECS (ref 58–77)
TRIGL SERPL-MCNC: 88 MG/DL (ref ?–150)
TROPONIN I SERPL-MCNC: 1.28 NG/ML
TROPONIN I SERPL-MCNC: 1.43 NG/ML
TROPONIN I SERPL-MCNC: 1.46 NG/ML
VENTRICULAR RATE, ECG03: 82 BPM
VENTRICULAR RATE, ECG03: 83 BPM
VLDLC SERPL CALC-MCNC: 17.6 MG/DL
WBC # BLD AUTO: 8.3 K/UL (ref 4.1–11.1)

## 2020-03-31 PROCEDURE — 74011000250 HC RX REV CODE- 250: Performed by: INTERNAL MEDICINE

## 2020-03-31 PROCEDURE — 77030028837 HC SYR ANGI PWR INJ COEU -A: Performed by: INTERNAL MEDICINE

## 2020-03-31 PROCEDURE — 74011000250 HC RX REV CODE- 250: Performed by: EMERGENCY MEDICINE

## 2020-03-31 PROCEDURE — 85610 PROTHROMBIN TIME: CPT

## 2020-03-31 PROCEDURE — 74011250637 HC RX REV CODE- 250/637: Performed by: HOSPITALIST

## 2020-03-31 PROCEDURE — 93005 ELECTROCARDIOGRAM TRACING: CPT

## 2020-03-31 PROCEDURE — 85025 COMPLETE CBC W/AUTO DIFF WBC: CPT

## 2020-03-31 PROCEDURE — 77030010221 HC SPLNT WR POS TELE -B: Performed by: INTERNAL MEDICINE

## 2020-03-31 PROCEDURE — 4A023N7 MEASUREMENT OF CARDIAC SAMPLING AND PRESSURE, LEFT HEART, PERCUTANEOUS APPROACH: ICD-10-PCS | Performed by: INTERNAL MEDICINE

## 2020-03-31 PROCEDURE — 36415 COLL VENOUS BLD VENIPUNCTURE: CPT

## 2020-03-31 PROCEDURE — 99152 MOD SED SAME PHYS/QHP 5/>YRS: CPT | Performed by: INTERNAL MEDICINE

## 2020-03-31 PROCEDURE — 85730 THROMBOPLASTIN TIME PARTIAL: CPT

## 2020-03-31 PROCEDURE — 77030015766: Performed by: INTERNAL MEDICINE

## 2020-03-31 PROCEDURE — 74011636320 HC RX REV CODE- 636/320: Performed by: INTERNAL MEDICINE

## 2020-03-31 PROCEDURE — 74011250636 HC RX REV CODE- 250/636: Performed by: NURSE PRACTITIONER

## 2020-03-31 PROCEDURE — 77030019569 HC BND COMPR RAD TERU -B: Performed by: INTERNAL MEDICINE

## 2020-03-31 PROCEDURE — 99153 MOD SED SAME PHYS/QHP EA: CPT | Performed by: INTERNAL MEDICINE

## 2020-03-31 PROCEDURE — 74011250636 HC RX REV CODE- 250/636: Performed by: HOSPITALIST

## 2020-03-31 PROCEDURE — 74011000250 HC RX REV CODE- 250: Performed by: NURSE PRACTITIONER

## 2020-03-31 PROCEDURE — C1769 GUIDE WIRE: HCPCS | Performed by: INTERNAL MEDICINE

## 2020-03-31 PROCEDURE — 80061 LIPID PANEL: CPT

## 2020-03-31 PROCEDURE — C1887 CATHETER, GUIDING: HCPCS | Performed by: INTERNAL MEDICINE

## 2020-03-31 PROCEDURE — 74011250637 HC RX REV CODE- 250/637: Performed by: NURSE PRACTITIONER

## 2020-03-31 PROCEDURE — 77030030195 HC CATH ANGI DX PRF4 MRTM -A: Performed by: INTERNAL MEDICINE

## 2020-03-31 PROCEDURE — 80053 COMPREHEN METABOLIC PANEL: CPT

## 2020-03-31 PROCEDURE — 65660000000 HC RM CCU STEPDOWN

## 2020-03-31 PROCEDURE — 90686 IIV4 VACC NO PRSV 0.5 ML IM: CPT | Performed by: HOSPITALIST

## 2020-03-31 PROCEDURE — 93458 L HRT ARTERY/VENTRICLE ANGIO: CPT | Performed by: INTERNAL MEDICINE

## 2020-03-31 PROCEDURE — 74011250636 HC RX REV CODE- 250/636: Performed by: EMERGENCY MEDICINE

## 2020-03-31 PROCEDURE — 74011250637 HC RX REV CODE- 250/637: Performed by: EMERGENCY MEDICINE

## 2020-03-31 PROCEDURE — 90471 IMMUNIZATION ADMIN: CPT

## 2020-03-31 PROCEDURE — 83036 HEMOGLOBIN GLYCOSYLATED A1C: CPT

## 2020-03-31 PROCEDURE — 77030004549 HC CATH ANGI DX PRF MRTM -A: Performed by: INTERNAL MEDICINE

## 2020-03-31 PROCEDURE — 84484 ASSAY OF TROPONIN QUANT: CPT

## 2020-03-31 PROCEDURE — B2111ZZ FLUOROSCOPY OF MULTIPLE CORONARY ARTERIES USING LOW OSMOLAR CONTRAST: ICD-10-PCS | Performed by: INTERNAL MEDICINE

## 2020-03-31 PROCEDURE — 74011250636 HC RX REV CODE- 250/636: Performed by: INTERNAL MEDICINE

## 2020-03-31 PROCEDURE — C1894 INTRO/SHEATH, NON-LASER: HCPCS | Performed by: INTERNAL MEDICINE

## 2020-03-31 RX ORDER — SODIUM CHLORIDE 9 MG/ML
100 INJECTION, SOLUTION INTRAVENOUS CONTINUOUS
Status: DISCONTINUED | OUTPATIENT
Start: 2020-03-31 | End: 2020-04-01

## 2020-03-31 RX ORDER — HEPARIN SODIUM 200 [USP'U]/100ML
INJECTION, SOLUTION INTRAVENOUS
Status: DISCONTINUED | OUTPATIENT
Start: 2020-03-31 | End: 2020-03-31

## 2020-03-31 RX ORDER — CALCIUM CARBONATE 200(500)MG
400 TABLET,CHEWABLE ORAL
Status: DISCONTINUED | OUTPATIENT
Start: 2020-03-31 | End: 2020-04-02 | Stop reason: HOSPADM

## 2020-03-31 RX ORDER — PANTOPRAZOLE SODIUM 40 MG/1
40 TABLET, DELAYED RELEASE ORAL
Status: DISCONTINUED | OUTPATIENT
Start: 2020-03-31 | End: 2020-04-01

## 2020-03-31 RX ORDER — MIDAZOLAM HYDROCHLORIDE 1 MG/ML
INJECTION, SOLUTION INTRAMUSCULAR; INTRAVENOUS AS NEEDED
Status: DISCONTINUED | OUTPATIENT
Start: 2020-03-31 | End: 2020-03-31

## 2020-03-31 RX ORDER — NITROGLYCERIN 20 MG/100ML
0-100 INJECTION INTRAVENOUS
Status: DISCONTINUED | OUTPATIENT
Start: 2020-03-31 | End: 2020-04-01

## 2020-03-31 RX ORDER — VERAPAMIL HYDROCHLORIDE 2.5 MG/ML
INJECTION, SOLUTION INTRAVENOUS AS NEEDED
Status: DISCONTINUED | OUTPATIENT
Start: 2020-03-31 | End: 2020-03-31

## 2020-03-31 RX ORDER — METOPROLOL TARTRATE 25 MG/1
25 TABLET, FILM COATED ORAL EVERY 12 HOURS
Status: DISCONTINUED | OUTPATIENT
Start: 2020-03-31 | End: 2020-03-31

## 2020-03-31 RX ORDER — LIDOCAINE HYDROCHLORIDE 10 MG/ML
INJECTION, SOLUTION EPIDURAL; INFILTRATION; INTRACAUDAL; PERINEURAL AS NEEDED
Status: DISCONTINUED | OUTPATIENT
Start: 2020-03-31 | End: 2020-03-31

## 2020-03-31 RX ORDER — HEPARIN SODIUM 1000 [USP'U]/ML
INJECTION, SOLUTION INTRAVENOUS; SUBCUTANEOUS AS NEEDED
Status: DISCONTINUED | OUTPATIENT
Start: 2020-03-31 | End: 2020-03-31

## 2020-03-31 RX ORDER — SODIUM CHLORIDE 0.9 % (FLUSH) 0.9 %
5-40 SYRINGE (ML) INJECTION AS NEEDED
Status: DISCONTINUED | OUTPATIENT
Start: 2020-03-31 | End: 2020-04-02 | Stop reason: HOSPADM

## 2020-03-31 RX ORDER — ASPIRIN 81 MG/1
81 TABLET ORAL DAILY
Status: DISCONTINUED | OUTPATIENT
Start: 2020-03-31 | End: 2020-04-02 | Stop reason: HOSPADM

## 2020-03-31 RX ORDER — AMLODIPINE BESYLATE 5 MG/1
5 TABLET ORAL DAILY
Status: DISCONTINUED | OUTPATIENT
Start: 2020-03-31 | End: 2020-04-01

## 2020-03-31 RX ORDER — METOPROLOL TARTRATE 50 MG/1
50 TABLET ORAL EVERY 12 HOURS
Status: DISCONTINUED | OUTPATIENT
Start: 2020-03-31 | End: 2020-03-31

## 2020-03-31 RX ORDER — HEPARIN SODIUM 5000 [USP'U]/ML
2000 INJECTION, SOLUTION INTRAVENOUS; SUBCUTANEOUS AS NEEDED
Status: DISCONTINUED | OUTPATIENT
Start: 2020-03-31 | End: 2020-03-31

## 2020-03-31 RX ORDER — HYDRALAZINE HYDROCHLORIDE 25 MG/1
25 TABLET, FILM COATED ORAL 3 TIMES DAILY
Status: DISCONTINUED | OUTPATIENT
Start: 2020-03-31 | End: 2020-04-01

## 2020-03-31 RX ORDER — HEPARIN SODIUM 5000 [USP'U]/ML
5000 INJECTION, SOLUTION INTRAVENOUS; SUBCUTANEOUS 3 TIMES DAILY
Status: DISCONTINUED | OUTPATIENT
Start: 2020-03-31 | End: 2020-04-01

## 2020-03-31 RX ORDER — NITROGLYCERIN 20 MG/100ML
0-100 INJECTION INTRAVENOUS
Status: DISCONTINUED | OUTPATIENT
Start: 2020-03-31 | End: 2020-03-31

## 2020-03-31 RX ORDER — ATORVASTATIN CALCIUM 40 MG/1
40 TABLET, FILM COATED ORAL
Status: DISCONTINUED | OUTPATIENT
Start: 2020-03-31 | End: 2020-04-02 | Stop reason: HOSPADM

## 2020-03-31 RX ORDER — ONDANSETRON 2 MG/ML
4 INJECTION INTRAMUSCULAR; INTRAVENOUS
Status: DISCONTINUED | OUTPATIENT
Start: 2020-03-31 | End: 2020-04-02 | Stop reason: HOSPADM

## 2020-03-31 RX ORDER — HEPARIN SODIUM 10000 [USP'U]/100ML
8-25 INJECTION, SOLUTION INTRAVENOUS
Status: DISCONTINUED | OUTPATIENT
Start: 2020-03-31 | End: 2020-03-31

## 2020-03-31 RX ORDER — HEPARIN SODIUM 5000 [USP'U]/ML
4000 INJECTION, SOLUTION INTRAVENOUS; SUBCUTANEOUS AS NEEDED
Status: DISCONTINUED | OUTPATIENT
Start: 2020-03-31 | End: 2020-03-31

## 2020-03-31 RX ORDER — SODIUM CHLORIDE 9 MG/ML
75 INJECTION, SOLUTION INTRAVENOUS CONTINUOUS
Status: DISCONTINUED | OUTPATIENT
Start: 2020-03-31 | End: 2020-03-31

## 2020-03-31 RX ORDER — LOSARTAN POTASSIUM 50 MG/1
100 TABLET ORAL DAILY
Status: DISCONTINUED | OUTPATIENT
Start: 2020-03-31 | End: 2020-04-01

## 2020-03-31 RX ORDER — ACETAMINOPHEN 325 MG/1
650 TABLET ORAL
Status: DISCONTINUED | OUTPATIENT
Start: 2020-03-31 | End: 2020-04-02 | Stop reason: HOSPADM

## 2020-03-31 RX ORDER — SODIUM CHLORIDE 0.9 % (FLUSH) 0.9 %
5-40 SYRINGE (ML) INJECTION EVERY 8 HOURS
Status: DISCONTINUED | OUTPATIENT
Start: 2020-03-31 | End: 2020-04-02 | Stop reason: HOSPADM

## 2020-03-31 RX ORDER — FENTANYL CITRATE 50 UG/ML
INJECTION, SOLUTION INTRAMUSCULAR; INTRAVENOUS AS NEEDED
Status: DISCONTINUED | OUTPATIENT
Start: 2020-03-31 | End: 2020-03-31

## 2020-03-31 RX ORDER — VARENICLINE TARTRATE 0.5 MG/1
1 TABLET, FILM COATED ORAL EVERY 12 HOURS
Status: DISCONTINUED | OUTPATIENT
Start: 2020-03-31 | End: 2020-04-02 | Stop reason: HOSPADM

## 2020-03-31 RX ADMIN — LOSARTAN POTASSIUM 100 MG: 50 TABLET, FILM COATED ORAL at 09:12

## 2020-03-31 RX ADMIN — HEPARIN SODIUM 19 UNITS/KG/HR: 10000 INJECTION, SOLUTION INTRAVENOUS at 09:13

## 2020-03-31 RX ADMIN — HEPARIN SODIUM 5000 UNITS: 5000 INJECTION INTRAVENOUS; SUBCUTANEOUS at 16:53

## 2020-03-31 RX ADMIN — NITROGLYCERIN 55 MCG/MIN: 20 INJECTION INTRAVENOUS at 05:02

## 2020-03-31 RX ADMIN — HYDRALAZINE HYDROCHLORIDE 25 MG: 25 TABLET, FILM COATED ORAL at 15:00

## 2020-03-31 RX ADMIN — ATORVASTATIN CALCIUM 40 MG: 40 TABLET, FILM COATED ORAL at 20:53

## 2020-03-31 RX ADMIN — NITROGLYCERIN 30 MCG/MIN: 20 INJECTION INTRAVENOUS at 18:05

## 2020-03-31 RX ADMIN — Medication 10 ML: at 06:00

## 2020-03-31 RX ADMIN — CALCIUM CARBONATE (ANTACID) CHEW TAB 500 MG 400 MG: 500 CHEW TAB at 22:58

## 2020-03-31 RX ADMIN — ACETAMINOPHEN 650 MG: 325 TABLET ORAL at 22:58

## 2020-03-31 RX ADMIN — METOPROLOL TARTRATE 50 MG: 50 TABLET, FILM COATED ORAL at 09:12

## 2020-03-31 RX ADMIN — ACETAMINOPHEN 650 MG: 325 TABLET ORAL at 18:07

## 2020-03-31 RX ADMIN — Medication 10 ML: at 20:54

## 2020-03-31 RX ADMIN — ACETAMINOPHEN 650 MG: 325 TABLET ORAL at 04:32

## 2020-03-31 RX ADMIN — VARENICLINE TARTRATE 1 MG: 0.5 TABLET, FILM COATED ORAL at 22:49

## 2020-03-31 RX ADMIN — LIDOCAINE HYDROCHLORIDE 40 ML: 20 SOLUTION ORAL; TOPICAL at 17:40

## 2020-03-31 RX ADMIN — INFLUENZA VIRUS VACCINE 0.5 ML: 15; 15; 15; 15 SUSPENSION INTRAMUSCULAR at 14:22

## 2020-03-31 RX ADMIN — SODIUM CHLORIDE 100 ML/HR: 900 INJECTION, SOLUTION INTRAVENOUS at 14:23

## 2020-03-31 RX ADMIN — HEPARIN SODIUM 8 UNITS/KG/HR: 10000 INJECTION, SOLUTION INTRAVENOUS at 00:35

## 2020-03-31 RX ADMIN — ASPIRIN 81 MG: 81 TABLET ORAL at 10:42

## 2020-03-31 RX ADMIN — VARENICLINE TARTRATE 1 MG: 0.5 TABLET, FILM COATED ORAL at 09:12

## 2020-03-31 RX ADMIN — HEPARIN SODIUM 4000 UNITS: 5000 INJECTION INTRAVENOUS; SUBCUTANEOUS at 09:12

## 2020-03-31 RX ADMIN — HYDRALAZINE HYDROCHLORIDE 25 MG: 25 TABLET, FILM COATED ORAL at 20:53

## 2020-03-31 RX ADMIN — NITROGLYCERIN 10 MCG/MIN: 20 INJECTION INTRAVENOUS at 00:32

## 2020-03-31 RX ADMIN — METOPROLOL TARTRATE 25 MG: 25 TABLET, FILM COATED ORAL at 01:42

## 2020-03-31 RX ADMIN — METOPROLOL TARTRATE 75 MG: 50 TABLET, FILM COATED ORAL at 20:52

## 2020-03-31 RX ADMIN — NITROGLYCERIN 35 MCG/MIN: 20 INJECTION INTRAVENOUS at 17:55

## 2020-03-31 RX ADMIN — SODIUM CHLORIDE 75 ML/HR: 900 INJECTION, SOLUTION INTRAVENOUS at 01:42

## 2020-03-31 RX ADMIN — LIDOCAINE HYDROCHLORIDE 40 ML: 20 SOLUTION ORAL; TOPICAL at 01:51

## 2020-03-31 RX ADMIN — PANTOPRAZOLE SODIUM 40 MG: 40 TABLET, DELAYED RELEASE ORAL at 14:52

## 2020-03-31 RX ADMIN — AMLODIPINE BESYLATE 5 MG: 5 TABLET ORAL at 14:52

## 2020-03-31 RX ADMIN — HEPARIN SODIUM 5000 UNITS: 5000 INJECTION INTRAVENOUS; SUBCUTANEOUS at 20:53

## 2020-03-31 NOTE — PROGRESS NOTES
0730: Re'd bedside report from [place]. Pt in bed with Heparin running at 8u/kg, Nitro at 50mcg/min and NS at 100ml/hr. Cards consult called, Dr Kyleigh Barksdale to see pt. Next aPTT due at 1400; current aPTT = 40.6; increased gtt but 4 units and gave 4000 unit bolus per protocol. Pt stated Cardiology saw him at bedside; no note indicating at of yet.

## 2020-03-31 NOTE — PROGRESS NOTES
FINA  Home w H2H  Follow up appointments   Wife will provide transportation at time of discharge    Reason for Admission:   NSTEMI                   RUR Score:          9           Plan for utilizing home health:     Qualifying dx for [facility] - pt is agreeable     PCP: First and Last name:  Zoie Salazar NP   Name of Practice:    Are you a current patient: Yes/No:    Approximate date of last visit:   1 mos ago                     Current Advanced Directive/Advance Care Plan:   Not on file    CM called patient and introduced self and explained role with healthcare team.  Pt name and  confirmed as pt identifiers. Demographics confirmed. Pt is , self employed as a . ADL's/IADL's - independent pta to include steps and driving  DME - none  Preferred Rx - [address]    Emergency contact to be updated to include wife -  Tiffany Logan [phone]    Care Management Interventions  PCP Verified by CM: Yes  Mode of Transport at Discharge:  Other (see comment)(wife)  Transition of Care Consult (CM Consult): Discharge Planning(qualifying dx for [facility]; NSTEMI)  Discharge Durable Medical Equipment: No  Physical Therapy Consult: No  Occupational Therapy Consult: No  Speech Therapy Consult: No  Current Support Network: Lives with Spouse(2 story home; able to navigate steps safely pta)  Confirm Follow Up Transport: Family  Discharge Location  Discharge Placement: Home with home health    Kimmie Pierre RN CM  Ext [phone]

## 2020-03-31 NOTE — ED PROVIDER NOTES
EMERGENCY DEPARTMENT HISTORY AND PHYSICAL EXAM      Date: 3/30/2020  Patient Name: Pilar Cody    History of Presenting Illness     Chief Complaint   Patient presents with    Transfer Of Care       History Provided By: Patient    HPI: Pilar Cody, 35 y.o. male with PMHx as noted below presents the emergency department as a transfer from [facility] with NSTEMI. Patient reports that he began feeling intermittent chest pain on Friday. He describes it as a midsternal sharp, stabbing pain that does not radiate. Pain seems without relieving or exacerbating factors. Patient reports that he is currently having no pain at this time. He was seen at [facility] earlier this evening where he had a troponin of 0.1. Was also noted to be hypertensive and was treated with nitroglycerin with minimal improvement. Was also given 324 of aspirin and started on a heparin infusion prior to transfer.       PCP: Vaishali Arriaga NP    Current Facility-Administered Medications   Medication Dose Route Frequency Provider Last Rate Last Dose    heparin 25,000 units in D5W 250 ml infusion  8-25 Units/kg/hr IntraVENous TITRATE Meli Doan MD 10 mL/hr at 03/31/20 0035 8 Units/kg/hr at 03/31/20 0035    heparin (porcine) injection 2,000 Units  2,000 Units IntraVENous PRN Meli Doan MD        Or    heparin (porcine) injection 4,000 Units  4,000 Units IntraVENous PRN Meli Doan MD        nitroglycerin (Tridil) 200 mcg/ml infusion  0-100 mcg/min IntraVENous TITRATE Meli Doan MD 7.5 mL/hr at 03/31/20 0348 25 mcg/min at 03/31/20 0348    metoprolol tartrate (LOPRESSOR) tablet 25 mg  25 mg Oral Q12H Wally Obrien MD   25 mg at 03/31/20 0142    sodium chloride (NS) flush 5-40 mL  5-40 mL IntraVENous Q8H Wally Obrien MD   Stopped at 03/31/20 0122    sodium chloride (NS) flush 5-40 mL  5-40 mL IntraVENous PRN Juan F Mckenzie MD        acetaminophen (TYLENOL) tablet 650 mg  650 mg Oral Q4H PRN Carlos Castillo MD        0.9% sodium chloride infusion  75 mL/hr IntraVENous CONTINUOUS Carlos Castillo MD 75 mL/hr at 03/31/20 0142 75 mL/hr at 03/31/20 0142    varenicline (CHANTIX) 0.5 mg tablet 1 mg  1 mg Oral Q12H Lauren Mckenzie MD        losartan (COZAAR) tablet 100 mg  100 mg Oral DAILY Lauren Mckenzie MD           Past History     Past Medical History:  Past Medical History:   Diagnosis Date    Elevated BP     Tobacco abuse        Past Surgical History:  Past Surgical History:   Procedure Laterality Date    HX ORTHOPAEDIC Left 1997    ankle       Family History:  Family History   Problem Relation Age of Onset    Hypertension Mother     Diabetes Father     Hypertension Sister     Hypertension Brother     Cancer Paternal Uncle     Cancer Paternal Grandfather        Social History:  Social History     Tobacco Use    Smoking status: Current Every Day Smoker     Packs/day: 1.00     Years: 20.00     Pack years: 20.00    Smokeless tobacco: Never Used   Substance Use Topics    Alcohol use: Yes     Alcohol/week: 15.0 standard drinks     Types: 12 Cans of beer, 6 Shots of liquor per week     Comment: socially on weekend    Drug use: No       Allergies:  No Known Allergies      Review of Systems   Review of Systems  Constitutional: Negative for fever, chills, and fatigue. HENT: Negative for congestion, sore throat, rhinorrhea, sneezing and neck stiffness   Eyes: Negative for discharge and redness. Respiratory: Negative for  shortness of breath, wheezing   Cardiovascular: Positive for chest pain. negative palpitations   Gastrointestinal: Negative for nausea, vomiting, abdominal pain, constipation, diarrhea and blood in stool. Genitourinary: Negative for dysuria, urgency, frequency, hematuria, flank pain, decreased urine volume, discharge,   Musculoskeletal: Negative for myalgias or joint pain . Skin: Negative for rash or lesions .    Neurological: Negative weakness, light-headedness, numbness and headaches. Physical Exam   Physical Exam    GENERAL: alert and oriented, no acute distress  EYES: PEERL, No injection, discharge or icterus. ENT: Mucous membranes pink and moist.  NECK: Supple  LUNGS: Airway patent. Non-labored respirations. Breath sounds clear with good air entry bilaterally. HEART: Regular rate and rhythm. No peripheral edema  ABDOMEN: Non-distended and non-tender, without guarding or rebound. SKIN:  warm, dry  EXTREMITIES: Without swelling, tenderness or deformity, symmetric with normal ROM  NEUROLOGICAL: Alert, oriented      Diagnostic Study Results     Labs -     Recent Results (from the past 12 hour(s))   EKG, 12 LEAD, INITIAL    Collection Time: 03/30/20 11:56 PM   Result Value Ref Range    Ventricular Rate 82 BPM    Atrial Rate 82 BPM    P-R Interval 190 ms    QRS Duration 110 ms    Q-T Interval 408 ms    QTC Calculation (Bezet) 476 ms    Calculated P Axis 51 degrees    Calculated R Axis -24 degrees    Calculated T Axis 127 degrees    Diagnosis       Sinus rhythm with premature atrial complexes  Possible Left atrial enlargement  Left ventricular hypertrophy with repolarization abnormality  No previous ECGs available     TROPONIN I    Collection Time: 03/31/20 12:02 AM   Result Value Ref Range    Troponin-I, Qt. 1.46 (H) <8.35 ng/mL   METABOLIC PANEL, COMPREHENSIVE    Collection Time: 03/31/20 12:02 AM   Result Value Ref Range    Sodium 139 136 - 145 mmol/L    Potassium 3.8 3.5 - 5.1 mmol/L    Chloride 107 97 - 108 mmol/L    CO2 28 21 - 32 mmol/L    Anion gap 4 (L) 5 - 15 mmol/L    Glucose 95 65 - 100 mg/dL    BUN 27 (H) 6 - 20 MG/DL    Creatinine 1.99 (H) 0.70 - 1.30 MG/DL    BUN/Creatinine ratio 14 12 - 20      GFR est AA 45 (L) >60 ml/min/1.73m2    GFR est non-AA 37 (L) >60 ml/min/1.73m2    Calcium 8.7 8.5 - 10.1 MG/DL    Bilirubin, total 0.2 0.2 - 1.0 MG/DL    ALT (SGPT) 33 12 - 78 U/L    AST (SGOT) 30 15 - 37 U/L    Alk.  phosphatase 52 45 - 117 U/L    Protein, total 7.7 6.4 - 8.2 g/dL    Albumin 3.7 3.5 - 5.0 g/dL    Globulin 4.0 2.0 - 4.0 g/dL    A-G Ratio 0.9 (L) 1.1 - 2.2     PTT    Collection Time: 03/31/20 12:02 AM   Result Value Ref Range    aPTT 41.9 (H) 22.1 - 32.0 sec    aPTT, therapeutic range     58.0 - 77.0 SECS   PROTHROMBIN TIME + INR    Collection Time: 03/31/20 12:02 AM   Result Value Ref Range    INR 1.0 0.9 - 1.1      Prothrombin time 10.6 9.0 - 11.1 sec   CBC WITH AUTOMATED DIFF    Collection Time: 03/31/20 12:43 AM   Result Value Ref Range    WBC 8.3 4.1 - 11.1 K/uL    RBC 4.57 4.10 - 5.70 M/uL    HGB 13.3 12.1 - 17.0 g/dL    HCT 40.5 36.6 - 50.3 %    MCV 88.6 80.0 - 99.0 FL    MCH 29.1 26.0 - 34.0 PG    MCHC 32.8 30.0 - 36.5 g/dL    RDW 13.9 11.5 - 14.5 %    PLATELET 934 (L) 326 - 400 K/uL    MPV 11.5 8.9 - 12.9 FL    NRBC 0.0 0  WBC    ABSOLUTE NRBC 0.00 0.00 - 0.01 K/uL    NEUTROPHILS 42 32 - 75 %    LYMPHOCYTES 38 12 - 49 %    MONOCYTES 16 (H) 5 - 13 %    EOSINOPHILS 3 0 - 7 %    BASOPHILS 1 0 - 1 %    IMMATURE GRANULOCYTES 0 0.0 - 0.5 %    ABS. NEUTROPHILS 3.4 1.8 - 8.0 K/UL    ABS. LYMPHOCYTES 3.2 0.8 - 3.5 K/UL    ABS. MONOCYTES 1.3 (H) 0.0 - 1.0 K/UL    ABS. EOSINOPHILS 0.3 0.0 - 0.4 K/UL    ABS. BASOPHILS 0.1 0.0 - 0.1 K/UL    ABS. IMM. GRANS. 0.0 0.00 - 0.04 K/UL    DF SMEAR SCANNED      PLATELET COMMENTS CLUMPED PLATELETS      RBC COMMENTS NORMOCYTIC, NORMOCHROMIC         Radiologic Studies -   No orders to display     CT Results  (Last 48 hours)    None        CXR Results  (Last 48 hours)    None            Medical Decision Making   I am the first provider for this patient. I reviewed the vital signs, available nursing notes, past medical history, past surgical history, family history and social history. Vital Signs-Reviewed the patient's vital signs.   Patient Vitals for the past 12 hrs:   Temp Pulse Resp BP SpO2   03/31/20 0343 97.8 °F (36.6 °C) 86 20 (!) 179/131    03/31/20 0315  80 21 (!) 160/133 98 %   03/31/20 0300  82 19 (!) 164/125 96 %   03/31/20 0245  83 19 (!) 160/122 97 %   03/31/20 0230  81 18 (!) 155/118 98 %   03/31/20 0215  82 23 (!) 162/123 98 %   03/31/20 0200  85 24 (!) 156/113 96 %   03/31/20 0145  90 18 (!) 164/110 96 %   03/31/20 0130  87 21 (!) 167/97 96 %   03/31/20 0115  86 23 (!) 162/110 96 %   03/31/20 0100  86 18 (!) 195/160 99 %   03/31/20 0045  85 27 (!) 169/118 96 %   03/31/20 0032  86      03/31/20 0030  86 18 (!) 172/124 96 %   03/31/20 0015  85 20 (!) 180/106 99 %   03/30/20 2353 98.2 °F (36.8 °C) 85 18 (!) 173/120 95 %       EKG interpretation: (Preliminary)  Rhythm: normal sinus rhythm; and regular . Rate (approx.): 82; Axis: normal; P wave: normal; QRS interval: normal ; ST/T wave: normal;  was interpreted by Arielle Kay MD,ED Provider. Records Reviewed: Nursing Notes and Old Medical Records    Provider Notes (Medical Decision Making): On presentation, the patient is well appearing, in no acute distress with normal vital signs. Based on my history and exam the differential diagnosis for this patient includes an NSTEMI, PE, aortic dissection, hypertensive emergency. Patient is pain-free at this time and making aortic dissection or pulmonary embolism less likely. Uncertain as to whether or not the elevated troponin is secondary to uncontrolled hypertension or underlying CAD. Patient already received aspirin, will continue heparin drip and start on nitro drip at this time. ED Course:   Initial assessment performed. The patients presenting problems have been discussed, and they are in agreement with the care plan formulated and outlined with them. I have encouraged them to ask questions as they arise throughout their visit. PROGRESS:  The patient has been re-evaluated and sx have improved. Reviewed available results with patient and have counseled them on diagnosis and care plan.  They have expressed understanding, and all their questions have been answered. They agree with plan for admission. CONSULT:  Rohith Shelton MD spoke with the hospitalist.  Discussed HPI and PE, available diagnostic tests and clinical findings. He is in agreement with care plans as outlined and will evaluate for admission     Admit Note  Patient is being admitted to the hospitalist.  The results of their tests and reasons for their admission have been discussed with them and/or available family. They convey agreement and understanding for the need to be admitted and for their admission diagnosis. Consultation has been made with the inpatient physician specialist for hospitalization. Critical Care Note  3:49 AM    IMPENDING DETERIORATION -Cardiovascular  ASSOCIATED RISK FACTORS - NSTEMI  MANAGEMENT- Bedside Assessment and Supervision of Care  INTERPRETATION -  ECG and Blood Pressure  INTERVENTIONS - hemodynamic mngmt  CASE REVIEW - Hospitalist  TREATMENT RESPONSE -Stable  PERFORMED BY - Self    NOTES   :  I have provided a total of 47 minutes of critical time not including time spent on separately documented procedures. The reason for providing this level of medical care for this critically ill patient was due to a critical illness that impaired one or more vital organ systems such that there was a high probability of imminent or life threatening deterioration in the patients condition. This care involved high complexity decision making to assess, manipulate, and support vital system functions,  lab review, consultations with specialist, family decision- making, bedside attention and documentation. During this entire length of time I was immediately available to the patient    Disposition:  admission    PLAN:  1. Admit     Diagnosis     Clinical Impression:   1. NSTEMI (non-ST elevated myocardial infarction) ([facility] [address]    2. Uncontrolled hypertension        Please note that this dictation was completed with Dragon, computer voice recognition software. Quite often unanticipated grammatical, syntax, homophones, and other interpretive errors are inadvertently transcribed by the computer software. Please disregard these errors. Additionally, please excuse any errors that have escaped final proofreading.

## 2020-03-31 NOTE — ED NOTES
TRANSFER - OUT REPORT:    Verbal report given to [facility] RN (name) on Rosalie Shook  being transferred to PCU [phone] (unit) for routine progression of care       Report consisted of patients Situation, Background, Assessment and   Recommendations(SBAR). Information from the following report(s) SBAR, ED Summary, [facility] and Recent Results was reviewed with the receiving nurse. Lines:   Peripheral IV 03/30/20 Left Antecubital (Active)   Site Assessment Clean, dry, & intact 3/30/2020 11:53 PM   Phlebitis Assessment 0 3/30/2020 11:53 PM   Infiltration Assessment 0 3/30/2020 11:53 PM   Dressing Status Clean, dry, & intact 3/30/2020 11:53 PM   Dressing Type Transparent 3/30/2020 11:53 PM   Hub Color/Line Status Green 3/30/2020 11:53 PM       Peripheral IV 03/31/20 Right Antecubital (Active)       Peripheral IV 03/31/20 Right Hand (Active)   Site Assessment Clean, dry, & intact 3/31/2020  1:12 AM   Phlebitis Assessment 0 3/31/2020  1:12 AM   Infiltration Assessment 0 3/31/2020  1:12 AM   Dressing Status Clean, dry, & intact 3/31/2020  1:12 AM   Dressing Type Transparent 3/31/2020  1:12 AM   Hub Color/Line Status Pink 3/31/2020  1:12 AM        Opportunity for questions and clarification was provided.

## 2020-03-31 NOTE — PROGRESS NOTES
Patient doing well post procedure. Nitro gtt still on at 15mcg for BP control. Will continue to monitor.

## 2020-03-31 NOTE — PROGRESS NOTES
Problem: Falls - Risk of  Goal: *Absence of Falls  Description: Document Marimar Snowden Fall Risk and appropriate interventions in the flowsheet.   Outcome: Progressing Towards Goal  Note: Fall Risk Interventions:            Medication Interventions: Assess postural VS orthostatic hypotension, Patient to call before getting OOB, Teach patient to arise slowly                   Problem: Patient Education: Go to Patient Education Activity  Goal: Patient/Family Education  Outcome: Progressing Towards Goal     Problem: Hypertension  Goal: *Blood pressure within specified parameters  Outcome: Progressing Towards Goal  Goal: *Fluid volume balance  Outcome: Progressing Towards Goal  Goal: *Labs within defined limits  Outcome: Progressing Towards Goal     Problem: Patient Education: Go to Patient Education Activity  Goal: Patient/Family Education  Outcome: Progressing Towards Goal     Problem: Patient Education: Go to Patient Education Activity  Goal: Patient/Family Education  Outcome: Progressing Towards Goal     Problem: Acute Renal Failure: Day 1  Goal: Activity/Safety  Outcome: Progressing Towards Goal  Goal: Consults, if ordered  Outcome: Progressing Towards Goal  Goal: Diagnostic Test/Procedures  Outcome: Progressing Towards Goal  Goal: Nutrition/Diet  Outcome: Progressing Towards Goal  Goal: Discharge Planning  Outcome: Progressing Towards Goal  Goal: Medications  Outcome: Progressing Towards Goal  Goal: Respiratory  Outcome: Progressing Towards Goal  Goal: Treatments/Interventions/Procedures  Outcome: Progressing Towards Goal  Goal: Psychosocial  Outcome: Progressing Towards Goal  Goal: *Optimal pain control at patient's stated goal  Outcome: Progressing Towards Goal  Goal: *Urinary output within identified parameters  Outcome: Progressing Towards Goal  Goal: *Hemodynamically stable  Outcome: Progressing Towards Goal  Goal: *Tolerating diet  Outcome: Progressing Towards Goal     Problem: Patient Education: Go to Patient Education Activity  Goal: Patient/Family Education  Outcome: Progressing Towards Goal     Problem: Unstable angina/NSTEMI: Day of Admission/Day 1  Goal: Activity/Safety  Outcome: Progressing Towards Goal  Goal: Consults, if ordered  Outcome: Progressing Towards Goal  Goal: Diagnostic Test/Procedures  Outcome: Progressing Towards Goal  Goal: Nutrition/Diet  Outcome: Progressing Towards Goal  Goal: Discharge Planning  Outcome: Progressing Towards Goal  Goal: Medications  Outcome: Progressing Towards Goal  Goal: Respiratory  Outcome: Progressing Towards Goal  Goal: Treatments/Interventions/Procedures  Outcome: Progressing Towards Goal  Goal: Psychosocial  Outcome: Progressing Towards Goal  Goal: *Hemodynamically stable  Outcome: Progressing Towards Goal  Goal: *Optimal pain control at patient's stated goal  Outcome: Progressing Towards Goal  Goal: *Lungs clear or at baseline  Outcome: Progressing Towards Goal

## 2020-03-31 NOTE — ED NOTES
2257-  is a NSTEMI transfer from [facility] brought in via [facility] ambulance. EMS reports VS stable en route. Pt c/o of sore throat and CP x Friday- Pt is not c/o of any CP upon arrival. Pts trop ar RTH was 0.12. Pts only medical hx is HTN. Pt has 1g in L Ac, 18G in R AC upon arrival. Pt has Heparin running at 10 ml en route- was stopped upon arrival- according to Dr. Mary Burk- new orders will be placed. Pt is A&O x4, GCS of 15. Cardiac Montior placed x 3.       0021- Dr. Mary Burk at bedside for evaluation. 0022- Pt had  Heparin going at 10 ml/Hr  That was started at 2238 at [address], PT 10.4, INR- 0.99 at [address]. Tdd Pharmacist made aware. Verified Heparin drip with João Pharmacist at 8 units per kg/hr. 0144- Pt c/o of \"slight indigestion and sore throat. \" verbal orders from Dr. Nieves Norman for GI Cocktail

## 2020-03-31 NOTE — PROGRESS NOTES
2760: Pt arrives to PCU, no distress, A&Ox4, nitro gtt infusing at 20mcg/min per pump. /131, pt denies CP at this time. Changed rate to 25mcg/min. Applied 2L NC. Reviewed plan of care. Pt instructed to notify health care team for recurrent CP or HA.    0356: /125. Increased nitro gtt to 30mcg/min.    0405: /127. Increased nitro gtt to 35mcg/min. 0416: /119. Increased nitro gtt to 40mcg/min.     0429:  /115. Increased nitro gtt to 45mcg/min. Pt c/o slight HA. Administered PRN Tylenol. Pt states he had a \"sharp twinge earlier\" of CP but it was transient and he has no CP at this moment. 0434: /117. Increased nitro to 50mcg/min.     0755: I have reviewed and agree with documentation provided this shift by salazar Bhat.

## 2020-03-31 NOTE — PROGRESS NOTES
Failed attempt to place [facility] order for [facility]. Software will not allow signature of attending to be added. Geraldine Cortes Liaison informed. [address] placed to [address]. CM will continue to follow for discharge planning.      [address] [facility], [place]  Ext [phone]

## 2020-03-31 NOTE — PROGRESS NOTES
TRANSFER - IN REPORT:    Verbal report received from Jose Miguel(name) on Óscar Kaiser  being received from ED(unit) for routine progression of care      Report consisted of patients Situation, Background, Assessment and   Recommendations(SBAR). Information from the following report(s) SBAR, ED Summary, MAR, Recent Results and Cardiac Rhythm NSR was reviewed with the receiving nurse. Opportunity for questions and clarification was provided. Assessment completed upon patients arrival to unit and care assumed. 0348 Pt arrived from ED    0502 increased Nitro to 55 mcg/min    Bedside and Verbal shift change report given to travis (oncoming nurse) by Heidi Duncan (offgoing nurse). Report included the following information SBAR, Kardex, ED Summary, Intake/Output, MAR, Recent Results, Med Rec Status and Cardiac Rhythm NSR.

## 2020-03-31 NOTE — H&P
HISTORY AND PHYSICAL      PCP: Cori Kim NP  History source: the patient      CC: chest pain      HPI: 35 y.o man with HTN and tobacco use who presents with chest pain. Symptoms began five days ago. He describes left-sided and substernal chest pressure that radiates to his jaw. It occurs with eating and with physical exertion. It is associated with nausea, vomiting, and dyspnea. No fever or cough. He went to [facility] and was transferred here after being found to have an elevated serum troponin. He is currently pain-free on a heparin drip and nitroglycerin. He works as a , is sedentary, and eats out every day. PMH/PSH:  HTN    Past Surgical History:   Procedure Laterality Date    HX ORTHOPAEDIC Left 1997    ankle       Home meds:   Prior to Admission medications    Medication Sig Start Date End Date Taking? Authorizing Provider   varenicline (CHANTIX) 1 mg tablet Take 1 Tab by mouth two (2) times a day. 3/20/20   Vaishali Arriaga NP   losartan (COZAAR) 100 mg tablet Take 1 Tab by mouth daily. 2/14/20   Vaishali Arriaga NP       Allergies:  No Known Allergies    FH:  Family History   Problem Relation Age of Onset    Hypertension Mother     Diabetes Father     Hypertension Sister     Hypertension Brother     Cancer Paternal Uncle     Cancer Paternal Grandfather        SH:  Social History     Tobacco Use    Smoking status: Current Every Day Smoker     Packs/day: 1.00     Years: 20.00     Pack years: 20.00    Smokeless tobacco: Never Used   Substance Use Topics    Alcohol use: Yes     Alcohol/week: 15.0 standard drinks     Types: 12 Cans of beer, 6 Shots of liquor per week     Comment: socially on weekend       ROS: A comprehensive review of systems was negative except for that written in the HPI.       PHYSICAL EXAM:  Visit Vitals  BP (!) 169/118   Pulse 86   Temp 98.2 °F (36.8 °C)   Resp 23   Ht 5' 11\" (1.803 m)   Wt 124.4 kg (274 lb 4 oz)   SpO2 96%   BMI 38.25 kg/m² Gen: NAD, non-toxic  HEENT: anicteric sclerae, normal conjunctiva, oropharynx clear, MM moist  Neck: supple, trachea midline, no adenopathy  Heart: RRR, no MRG, no JVD, no peripheral edema  Lungs: CTA b/l, non-labored respirations  Abd: soft, NT, ND, BS+, no organomegaly  Extr: warm  Skin: dry, no rash  Neuro: CN II-XII grossly intact, normal speech, moves all extremities  Psych: normal mood, appropriate affect      Labs/Imaging:  Recent Results (from the past 24 hour(s))   EKG, 12 LEAD, INITIAL    Collection Time: 03/30/20 11:56 PM   Result Value Ref Range    Ventricular Rate 82 BPM    Atrial Rate 82 BPM    P-R Interval 190 ms    QRS Duration 110 ms    Q-T Interval 408 ms    QTC Calculation (Bezet) 476 ms    Calculated P Axis 51 degrees    Calculated R Axis -24 degrees    Calculated T Axis 127 degrees    Diagnosis       Sinus rhythm with premature atrial complexes  Possible Left atrial enlargement  Left ventricular hypertrophy with repolarization abnormality  No previous ECGs available     TROPONIN I    Collection Time: 03/31/20 12:02 AM   Result Value Ref Range    Troponin-I, Qt. 1.46 (H) <1.15 ng/mL   METABOLIC PANEL, COMPREHENSIVE    Collection Time: 03/31/20 12:02 AM   Result Value Ref Range    Sodium 139 136 - 145 mmol/L    Potassium 3.8 3.5 - 5.1 mmol/L    Chloride 107 97 - 108 mmol/L    CO2 28 21 - 32 mmol/L    Anion gap 4 (L) 5 - 15 mmol/L    Glucose 95 65 - 100 mg/dL    BUN 27 (H) 6 - 20 MG/DL    Creatinine 1.99 (H) 0.70 - 1.30 MG/DL    BUN/Creatinine ratio 14 12 - 20      GFR est AA 45 (L) >60 ml/min/1.73m2    GFR est non-AA 37 (L) >60 ml/min/1.73m2    Calcium 8.7 8.5 - 10.1 MG/DL    Bilirubin, total 0.2 0.2 - 1.0 MG/DL    ALT (SGPT) 33 12 - 78 U/L    AST (SGOT) 30 15 - 37 U/L    Alk.  phosphatase 52 45 - 117 U/L    Protein, total 7.7 6.4 - 8.2 g/dL    Albumin 3.7 3.5 - 5.0 g/dL    Globulin 4.0 2.0 - 4.0 g/dL    A-G Ratio 0.9 (L) 1.1 - 2.2     PTT    Collection Time: 03/31/20 12:02 AM   Result Value Ref Range    aPTT 41.9 (H) 22.1 - 32.0 sec    aPTT, therapeutic range     58.0 - 77.0 SECS   PROTHROMBIN TIME + INR    Collection Time: 03/31/20 12:02 AM   Result Value Ref Range    INR 1.0 0.9 - 1.1      Prothrombin time 10.6 9.0 - 11.1 sec   CBC WITH AUTOMATED DIFF    Collection Time: 03/31/20 12:43 AM   Result Value Ref Range    WBC 8.3 4.1 - 11.1 K/uL    RBC 4.57 4.10 - 5.70 M/uL    HGB 13.3 12.1 - 17.0 g/dL    HCT 40.5 36.6 - 50.3 %    MCV 88.6 80.0 - 99.0 FL    MCH 29.1 26.0 - 34.0 PG    MCHC 32.8 30.0 - 36.5 g/dL    RDW 13.9 11.5 - 14.5 %    PLATELET 982 (L) 256 - 400 K/uL    MPV 11.5 8.9 - 12.9 FL    NRBC 0.0 0  WBC    ABSOLUTE NRBC 0.00 0.00 - 0.01 K/uL    NEUTROPHILS 42 32 - 75 %    LYMPHOCYTES 38 12 - 49 %    MONOCYTES 16 (H) 5 - 13 %    EOSINOPHILS 3 0 - 7 %    BASOPHILS 1 0 - 1 %    IMMATURE GRANULOCYTES 0 0.0 - 0.5 %    ABS. NEUTROPHILS 3.4 1.8 - 8.0 K/UL    ABS. LYMPHOCYTES 3.2 0.8 - 3.5 K/UL    ABS. MONOCYTES 1.3 (H) 0.0 - 1.0 K/UL    ABS. EOSINOPHILS 0.3 0.0 - 0.4 K/UL    ABS. BASOPHILS 0.1 0.0 - 0.1 K/UL    ABS. IMM. GRANS. 0.0 0.00 - 0.04 K/UL    DF SMEAR SCANNED      PLATELET COMMENTS CLUMPED PLATELETS      RBC COMMENTS NORMOCYTIC, NORMOCHROMIC         Recent Labs     03/31/20  0043   WBC 8.3   HGB 13.3   HCT 40.5   *     Recent Labs     03/31/20  0002      K 3.8      CO2 28   BUN 27*   CREA 1.99*   GLU 95   CA 8.7     Recent Labs     03/31/20  0002   SGOT 30   ALT 33   AP 52   TBILI 0.2   TP 7.7   ALB 3.7   GLOB 4.0       Recent Labs     03/31/20  0002   TROIQ 1.46*       Recent Labs     03/31/20  0002   INR 1.0   PTP 10.6   APTT 41.9*        No results for input(s): PH, PCO2, PO2 in the last 72 hours.           Assessment & Plan:     NSTEMI:  -continue heparin drip, nitroglycerin drip  -consult cardiology  -trend biomarkers  -start metoprolol  -check lipid panel and a1c  -echocardiogram    HTN: uncontrolled  -continue losartan  -on nitro drip    Tobacco use:  -continue Chantix  - cessation    Patient encouraged to seek help for lifestyle modifications including smoking cessation, diet improvement and exercise after being cleared medically.     DVT ppx: anticoagulated  Code status: full  Disposition: home when ready    Signed By: Bryant Dwyer MD     March 31, 2020

## 2020-03-31 NOTE — PROGRESS NOTES
Mr. Liz Minaya is s/p cardiac cath with no significant CAD - chest discomfort has resolved  BP continues to be uncontrolled on IV nitro. Increasing BB, adding Norvasc and Hydralazine. ARB on hold due to elevated CR. Wean IV nitro for BP < 150/90    Elevated troponin r/t uncontrolled BP and type 2 NSTEMI. Continue on ASA 81mg daily, BB, statin, and Plavix x 1 month.      Echo still pending

## 2020-03-31 NOTE — PROGRESS NOTES
Hospitalist Progress Note    NAME: Rosa Mancera   :  1980   MRN:  [id number]       Interim Hospital Summary: 35 y.o. male whom presented on 3/30/2020 with chest pain. Assessment / Plan:  NSTEMI  Elevated troponin (1.46->1.43->1.28)  Poorly controlled HTN  Hyperlipidemia  - A1C 5.7    TG 88, total cholesterol 133    appreciate cardiology input;     S/p cardiac cath on 3/31/2020 revealed no ischemia    Echo to be done    ARB on hold due to elevated Creat    Continue with BB, norvasc, and hydrialazine    Continue with IV nitroglycerin drip    Continue with daily ASA and statin    Continue with plavix for one month    PALAK  - Creat 1.99; was 1.48 on 10/2017. Was not following up with health care provider regularly. Will continue to monitor    Continue with IVF    Avoid nephrotoxic agent; ARB is on hold    Tobacco abuse  - continue Chantix    Pt agreed with importance of smoking cessation    BMI 38.25 kg/m²   Obese - discussed with pt about importance of life style modification which pt agreed    Code status: Full  Prophylaxis: Hep SQ  Recommended Disposition: Home w/Family     Subjective:     Chief Complaint / Reason for Physician Visit  \"no chest pain now, but it really scared me when it happened\". Discussed with RN events overnight. Review of Systems:  Symptom Y/N Comments  Symptom Y/N Comments   Fever/Chills n   Chest Pain n    Poor Appetite    Edema     Cough    Abdominal Pain     Sputum    Joint Pain     SOB/DYE n   Pruritis/Rash     Nausea/vomit n   Tolerating PT/OT     Diarrhea n   Tolerating Diet     Constipation n   Other       Could NOT obtain due to:      Objective:     VITALS:   Last 24hrs VS reviewed since prior progress note.  Most recent are:  Patient Vitals for the past 24 hrs:   Temp Pulse Resp BP SpO2   20 0650    (!) 159/115    20 0640    139/85    20 0635    149/84    20 0630    147/85    20 0610    (!) 152/92    20 0605    (!) 151/103    03/31/20 0600    (!) 152/110    03/31/20 0555    (!) 162/103    03/31/20 0550    (!) 153/111    03/31/20 0545    (!) 165/105    03/31/20 0540    (!) 166/113    03/31/20 0535    (!) 162/115    03/31/20 0530    (!) 169/117    03/31/20 0525    (!) 151/116    03/31/20 0520    (!) 159/123    03/31/20 0515    (!) 152/120    03/31/20 0510    (!) 150/127    03/31/20 0505    (!) 155/110    03/31/20 0502  83  (!) 172/118    03/31/20 0500    (!) 172/118    03/31/20 0455    (!) 171/113    03/31/20 0450    (!) 170/115    03/31/20 0445    (!) 169/115    03/31/20 0440    (!) 173/118    03/31/20 0435    (!) 176/117    03/31/20 0430    (!) 167/115    03/31/20 0425    (!) 163/111    03/31/20 0420    (!) 174/124    03/31/20 0416    (!) 171/119    03/31/20 0415    (!) 171/119    03/31/20 0400  79  (!) 163/127 100 %   03/31/20 0355    (!) 169/125    03/31/20 0343 97.8 °F (36.6 °C) 86 20 (!) 179/131 100 %   03/31/20 0315  80 21 (!) 160/133 98 %   03/31/20 0300  82 19 (!) 164/125 96 %   03/31/20 0245  83 19 (!) 160/122 97 %   03/31/20 0230  81 18 (!) 155/118 98 %   03/31/20 0215  82 23 (!) 162/123 98 %   03/31/20 0200  85 24 (!) 156/113 96 %   03/31/20 0145  90 18 (!) 164/110 96 %   03/31/20 0130  87 21 (!) 167/97 96 %   03/31/20 0115  86 23 (!) 162/110 96 %   03/31/20 0100  86 18 (!) 195/160 99 %   03/31/20 0045  85 27 (!) 169/118 96 %   03/31/20 0032  86      03/31/20 0030  86 18 (!) 172/124 96 %   03/31/20 0015  85 20 (!) 180/106 99 %   03/30/20 2353 98.2 °F (36.8 °C) 85 18 (!) 173/120 95 %       Intake/Output Summary (Last 24 hours) at 3/31/2020 0729  Last data filed at 3/31/2020 0343  Gross per 24 hour   Intake 199.41 ml   Output    Net 199.41 ml        PHYSICAL EXAM:  General: Obese. Alert, cooperative, no acute distress    EENT:  EOMI. Anicteric sclerae.  MMM  Resp:  CTA bilaterally, no wheezing or rales. No accessory muscle use  CV:  Regular  rhythm,  No edema  GI:  Soft, Non distended, Non tender. +Bowel sounds  Neurologic:  Alert and oriented X 3, normal speech,   Psych:   Good insight. Not anxious nor agitated  Skin:  No rashes. No jaundice    Reviewed most current lab test results and cultures  YES  Reviewed most current radiology test results   YES  Review and summation of old records today    NO  Reviewed patient's current orders and MAR    YES  PMH/SH reviewed - no change compared to H&P  ________________________________________________________________________  Care Plan discussed with:    Comments   Patient y    [facility] y    Consultant                        Multidiciplinary team rounds were held today with , nursing, pharmacist and clinical coordinator. Patient's plan of care was discussed; medications were reviewed and discharge planning was addressed. ________________________________________________________________________  Yamileth Robles NP     Procedures: see electronic medical records for all procedures/Xrays and details which were not copied into this note but were reviewed prior to creation of Plan. LABS:  I reviewed today's most current labs and imaging studies.   Pertinent labs include:  Recent Labs     03/31/20  0043   WBC 8.3   HGB 13.3   HCT 40.5   *     Recent Labs     03/31/20  0002      K 3.8      CO2 28   GLU 95   BUN 27*   CREA 1.99*   CA 8.7   ALB 3.7   TBILI 0.2   SGOT 30   ALT 33   INR 1.0       Signed: )Latonia Carrera NP

## 2020-03-31 NOTE — CONSULTS
[facility] [address] [facility]     Date of  Admission: 3/30/2020 11:51 PM     Admission type:Emergency    Consult for: NSTEMI  Consult by: hospitalist     Subjective:     Che Ho is a 35 y.o. male admitted for NSTEMI (non-ST elevated myocardial infarction) ([facility] [I21.4]. Transferred from [facility] for further evaluation of NTEMI/elevated troponin. Admitted with c/o chest discomfort, pressure 7/10, diaphoresis, nausea, SOB which started on 3/28/2020 and continued to increase in intensity. BP uncontrolled, and continues with chest discomfort 3/10, on IV nitro 50mcgs. ECG SR with significant flipped STs and/lat  Troponin 1.46 peak. PMH:  +HTN, +Fam Hx of early CAD (both parents, brother and sister), +tob use    Cardiac risk factors: smoking/ tobacco exposure, family history, male gender, hypertension. Patient Active Problem List    Diagnosis Date Noted    NSTEMI (non-ST elevated myocardial infarction) ([facility] 03/31/2020    Family history of early CAD 03/31/2020    Acute renal failure ([facility] 03/31/2020    Severe obesity ([facility] 02/14/2020    Hypertension 03/27/2015    Tobacco abuse 03/27/2015    Toenail fungus 03/27/2015      Vaishali Arriaga NP  Past Medical History:   Diagnosis Date    Acute renal failure ([facility] 3/31/2020    Elevated BP     Family history of early CAD 3/31/2020    Tobacco abuse       Social History     Socioeconomic History    Marital status:      Spouse name: Not on file    Number of children: Not on file    Years of education: Not on file    Highest education level: Not on file   Tobacco Use    Smoking status: Current Every Day Smoker     Packs/day: 1.00     Years: 20.00     Pack years: 20.00    Smokeless tobacco: Never Used   Substance and Sexual Activity    Alcohol use:  Yes     Alcohol/week: 15.0 standard drinks     Types: 12 Cans of beer, 6 Shots of liquor per week     Comment: socially on weekend    Drug use: No    Sexual activity: Yes     Partners: Female     Birth control/protection: None   Social History Narrative    Lives with Fiance. 3 kids 25, 12, and 6.  3 stepkids 18,11, and 7. [employer].      No Known Allergies   Family History   Problem Relation Age of Onset    Hypertension Mother     Diabetes Father     Hypertension Sister     Hypertension Brother     Cancer Paternal Uncle     Cancer Paternal Grandfather       Current Facility-Administered Medications   Medication Dose Route Frequency    heparin 25,000 units in D5W 250 ml infusion  8-25 Units/kg/hr IntraVENous TITRATE    heparin (porcine) injection 2,000 Units  2,000 Units IntraVENous PRN    Or    heparin (porcine) injection 4,000 Units  4,000 Units IntraVENous PRN    nitroglycerin (Tridil) 200 mcg/ml infusion  0-100 mcg/min IntraVENous TITRATE    sodium chloride (NS) flush 5-40 mL  5-40 mL IntraVENous Q8H    sodium chloride (NS) flush 5-40 mL  5-40 mL IntraVENous PRN    acetaminophen (TYLENOL) tablet 650 mg  650 mg Oral Q4H PRN    0.9% sodium chloride infusion  75 mL/hr IntraVENous CONTINUOUS    varenicline (CHANTIX) 0.5 mg tablet 1 mg  1 mg Oral Q12H    [Held by provider] losartan (COZAAR) tablet 100 mg  100 mg Oral DAILY    influenza vaccine 2019-20 (6 mos+)(PF) (FLUARIX/FLULAVAL/FLUZONE QUAD) injection 0.5 mL  0.5 mL IntraMUSCular PRIOR TO DISCHARGE    metoprolol tartrate (LOPRESSOR) tablet 50 mg  50 mg Oral Q12H    0.9% sodium chloride infusion  100 mL/hr IntraVENous CONTINUOUS    aspirin delayed-release tablet 81 mg  81 mg Oral DAILY    atorvastatin (LIPITOR) tablet 40 mg  40 mg Oral QHS        Review of Symptoms:   11 systems reviewed, negative other than as stated in the HPI        Objective:      Visit Vitals  BP (!) 148/105 (BP 1 Location: Left arm, BP Patient Position: At rest)   Pulse 84   Temp 97.9 °F (36.6 °C)   Resp 18   Ht 5' 11\" (1.803 m)   Wt 123.6 kg (272 lb 6.4 oz)   SpO2 98%   BMI 37.99 kg/m²       Physical:   General: WNWD young AAM in no acute distress  Heart: RRR, no m/S3/JVD, no carotid bruits   Lungs: clear   Abdomen: Soft, +BS, NTND   Extremities: LE adeel +DP/PT, no edema   Neurologic: Grossly normal    Data Review:   Recent Labs     03/31/20  0043   WBC 8.3   HGB 13.3   HCT 40.5   *     Recent Labs     03/31/20  0002      K 3.8      CO2 28   GLU 95   BUN 27*   CREA 1.99*   CA 8.7   ALB 3.7   TBILI 0.2   SGOT 30   ALT 33   INR 1.0       Recent Labs     03/31/20  0636 03/31/20  0320 03/31/20  0002   TROIQ 1.28* 1.43* 1.46*         Intake/Output Summary (Last 24 hours) at 3/31/2020 1113  Last data filed at 3/31/2020 0710  Gross per 24 hour   Intake 199.41 ml   Output 250 ml   Net -50.59 ml        Cardiographics    Telemetry: SR with ST   ECG: SR with inverted TWaves    Echocardiogram: pending       Assessment:       Principal Problem:    NSTEMI (non-ST elevated myocardial infarction) ([facility] (3/31/2020)    Active Problems:    Hypertension (3/27/2015)      Tobacco abuse (3/27/2015)      Family history of early CAD (3/31/2020)      Acute renal failure ([facility] (3/31/2020)         Plan:     NSTEMI:  Continues with chest discomfort, ECG changes, and positive troponins, recommend cardiac cath. Dr. Jelani Jackson and I discussed the risks/benefits/alternatives of cardiac catheterization +/- PCI with the patient. Risks include (but are not limited to) bleeding, infection, cva/mi/tamponade/death. The patient understands and wishes to proceed.   Started on ASA, metoprolol, statin, IV heparin and IV nitro  Echo pending     HTN:  Uncontrolled, had been on ARB which is holding for now due to ARF  Continue on BB and IV nitro and will adjust medications post cath    ARF:  No previous hx of kidney disease, hydrating pre cath  Monitor labs    Tobacco abuse:  Using Chantix within last month is down to 5 cigs/day  Encouraged Cessation      Thank you for consutling [facility]    3/31/2020         Patient seen, examined by me personally. Plan discussed as detailed. Agree with note as outlined by  NP. I confirm findings in history and physical exam. No additional findings noted. Agree with plan as outlined above. Patient with NSTEMI. Continued symptoms despite therapy, risk factors as noted. ASA, beta blocker, NTG. Discussed cath/PCI.     Hay Melo MD

## 2020-04-01 ENCOUNTER — APPOINTMENT (OUTPATIENT)
Dept: NON INVASIVE DIAGNOSTICS | Age: 40
DRG: 281 | End: 2020-04-01
Attending: HOSPITALIST
Payer: COMMERCIAL

## 2020-04-01 PROBLEM — I51.7 SEVERE LEFT VENTRICULAR HYPERTROPHY: Status: ACTIVE | Noted: 2020-04-01

## 2020-04-01 PROBLEM — I50.32 CHRONIC DIASTOLIC HEART FAILURE, NYHA CLASS 2 (HCC): Status: ACTIVE | Noted: 2020-04-01

## 2020-04-01 LAB
ANION GAP SERPL CALC-SCNC: 5 MMOL/L (ref 5–15)
AV PEAK GRADIENT: 62.5 MMHG
AV VELOCITY RATIO: 0.85
BUN SERPL-MCNC: 17 MG/DL (ref 6–20)
BUN/CREAT SERPL: 13 (ref 12–20)
CALCIUM SERPL-MCNC: 8 MG/DL (ref 8.5–10.1)
CHLORIDE SERPL-SCNC: 111 MMOL/L (ref 97–108)
CO2 SERPL-SCNC: 25 MMOL/L (ref 21–32)
COMMENT, HOLDF: NORMAL
CREAT SERPL-MCNC: 1.34 MG/DL (ref 0.7–1.3)
ECHO AV AREA PEAK VELOCITY: 4 CM2
ECHO AV PEAK GRADIENT: 4.1 MMHG
ECHO AV PEAK VELOCITY: 101.2 CM/S
ECHO AV REGURGITANT PHT: 863.5 CM
ECHO EST RA PRESSURE: 10 MMHG
ECHO LA AREA 4C: 28.7 CM2
ECHO LA VOL 4C: 106.18 ML (ref 18–58)
ECHO LA VOLUME INDEX A4C: 44.03 ML/M2 (ref 16–28)
ECHO LV E' LATERAL VELOCITY: 6.53 CM/S
ECHO LV E' SEPTAL VELOCITY: 8.94 CM/S
ECHO LV EDV A4C: 93.6 ML
ECHO LV EDV INDEX A4C: 38.8 ML/M2
ECHO LV EJECTION FRACTION A4C: 60 %
ECHO LV ESV A4C: 37.4 ML
ECHO LV ESV INDEX A4C: 15.5 ML/M2
ECHO LV INTERNAL DIMENSION DIASTOLIC MMODE: 5.12 CM
ECHO LV INTERNAL DIMENSION SYSTOLIC MMODE: 3.37 CM
ECHO LV IVSD MMODE: 2.36 CM
ECHO LV IVSS MMODE: 2.85 CM
ECHO LV POSTERIOR WALL DIASTOLIC MMODE: 2.14 CM
ECHO LV POSTERIOR WALL SYSTOLIC MMODE: 2.57 CM
ECHO LVOT DIAM: 2.44 CM
ECHO LVOT PEAK GRADIENT: 3 MMHG
ECHO LVOT PEAK VELOCITY: 86.48 CM/S
ECHO MV A VELOCITY: 33.63 CM/S
ECHO MV E DECELERATION TIME (DT): 123.8 MS
ECHO MV E VELOCITY: 93.31 CM/S
ECHO MV E/A RATIO: 2.77
ECHO MV E/E' LATERAL: 14.29
ECHO MV E/E' RATIO (AVERAGED): 12.36
ECHO MV E/E' SEPTAL: 10.44
ECHO MV REGURGITANT PEAK GRADIENT: 59.1 MMHG
ECHO MV REGURGITANT PEAK VELOCITY: 384.52 CM/S
ECHO PULMONARY ARTERY SYSTOLIC PRESSURE (PASP): 27.2 MMHG
ECHO PV MAX VELOCITY: 57.34 CM/S
ECHO PV PEAK GRADIENT: 1.3 MMHG
ECHO RIGHT VENTRICULAR SYSTOLIC PRESSURE (RVSP): 27.2 MMHG
ECHO RV INTERNAL DIMENSION: 3.35 CM
ECHO TV REGURGITANT MAX VELOCITY: 207.55 CM/S
ECHO TV REGURGITANT PEAK GRADIENT: 17.2 MMHG
GLUCOSE SERPL-MCNC: 85 MG/DL (ref 65–100)
LVFS: 34.13 %
MV DEC SLOPE: 7.53
PISA AR MAX VEL: 395.28 CM/S
POTASSIUM SERPL-SCNC: 3.5 MMOL/L (ref 3.5–5.1)
SAMPLES BEING HELD,HOLD: NORMAL
SODIUM SERPL-SCNC: 141 MMOL/L (ref 136–145)

## 2020-04-01 PROCEDURE — 36415 COLL VENOUS BLD VENIPUNCTURE: CPT

## 2020-04-01 PROCEDURE — 74011250637 HC RX REV CODE- 250/637: Performed by: NURSE PRACTITIONER

## 2020-04-01 PROCEDURE — 93306 TTE W/DOPPLER COMPLETE: CPT

## 2020-04-01 PROCEDURE — 74011250636 HC RX REV CODE- 250/636: Performed by: NURSE PRACTITIONER

## 2020-04-01 PROCEDURE — 65660000000 HC RM CCU STEPDOWN

## 2020-04-01 PROCEDURE — 80048 BASIC METABOLIC PNL TOTAL CA: CPT

## 2020-04-01 PROCEDURE — 74011250637 HC RX REV CODE- 250/637: Performed by: HOSPITALIST

## 2020-04-01 RX ORDER — CLOPIDOGREL BISULFATE 75 MG/1
75 TABLET ORAL DAILY
Status: DISCONTINUED | OUTPATIENT
Start: 2020-04-01 | End: 2020-04-02 | Stop reason: HOSPADM

## 2020-04-01 RX ORDER — AMLODIPINE BESYLATE 5 MG/1
10 TABLET ORAL DAILY
Status: DISCONTINUED | OUTPATIENT
Start: 2020-04-02 | End: 2020-04-02 | Stop reason: HOSPADM

## 2020-04-01 RX ORDER — FUROSEMIDE 10 MG/ML
40 INJECTION INTRAMUSCULAR; INTRAVENOUS ONCE
Status: COMPLETED | OUTPATIENT
Start: 2020-04-01 | End: 2020-04-01

## 2020-04-01 RX ORDER — PANTOPRAZOLE SODIUM 40 MG/1
40 TABLET, DELAYED RELEASE ORAL
Status: DISCONTINUED | OUTPATIENT
Start: 2020-04-01 | End: 2020-04-02 | Stop reason: HOSPADM

## 2020-04-01 RX ORDER — METOPROLOL TARTRATE 50 MG/1
100 TABLET ORAL EVERY 12 HOURS
Status: DISCONTINUED | OUTPATIENT
Start: 2020-04-01 | End: 2020-04-02 | Stop reason: HOSPADM

## 2020-04-01 RX ORDER — HYDRALAZINE HYDROCHLORIDE 50 MG/1
50 TABLET, FILM COATED ORAL 3 TIMES DAILY
Status: DISCONTINUED | OUTPATIENT
Start: 2020-04-01 | End: 2020-04-01

## 2020-04-01 RX ORDER — LOSARTAN POTASSIUM 50 MG/1
50 TABLET ORAL DAILY
Status: DISCONTINUED | OUTPATIENT
Start: 2020-04-01 | End: 2020-04-02 | Stop reason: HOSPADM

## 2020-04-01 RX ORDER — METOPROLOL TARTRATE 50 MG/1
100 TABLET ORAL EVERY 12 HOURS
Status: DISCONTINUED | OUTPATIENT
Start: 2020-04-01 | End: 2020-04-01

## 2020-04-01 RX ORDER — HYDRALAZINE HYDROCHLORIDE 20 MG/ML
20 INJECTION INTRAMUSCULAR; INTRAVENOUS
Status: DISCONTINUED | OUTPATIENT
Start: 2020-04-01 | End: 2020-04-02 | Stop reason: HOSPADM

## 2020-04-01 RX ADMIN — METOPROLOL TARTRATE 75 MG: 50 TABLET, FILM COATED ORAL at 07:37

## 2020-04-01 RX ADMIN — VARENICLINE TARTRATE 1 MG: 0.5 TABLET, FILM COATED ORAL at 09:07

## 2020-04-01 RX ADMIN — Medication 10 ML: at 04:23

## 2020-04-01 RX ADMIN — CLOPIDOGREL BISULFATE 75 MG: 75 TABLET ORAL at 12:08

## 2020-04-01 RX ADMIN — AMLODIPINE BESYLATE 5 MG: 5 TABLET ORAL at 07:37

## 2020-04-01 RX ADMIN — PANTOPRAZOLE SODIUM 40 MG: 40 TABLET, DELAYED RELEASE ORAL at 07:38

## 2020-04-01 RX ADMIN — FUROSEMIDE 40 MG: 10 INJECTION, SOLUTION INTRAMUSCULAR; INTRAVENOUS at 10:10

## 2020-04-01 RX ADMIN — HYDRALAZINE HYDROCHLORIDE 50 MG: 50 TABLET, FILM COATED ORAL at 16:49

## 2020-04-01 RX ADMIN — LOSARTAN POTASSIUM 50 MG: 50 TABLET ORAL at 09:07

## 2020-04-01 RX ADMIN — HYDRALAZINE HYDROCHLORIDE 50 MG: 50 TABLET, FILM COATED ORAL at 20:38

## 2020-04-01 RX ADMIN — SODIUM CHLORIDE 100 ML/HR: 900 INJECTION, SOLUTION INTRAVENOUS at 00:10

## 2020-04-01 RX ADMIN — METOPROLOL TARTRATE 100 MG: 50 TABLET, FILM COATED ORAL at 16:49

## 2020-04-01 RX ADMIN — Medication 10 ML: at 20:15

## 2020-04-01 RX ADMIN — ATORVASTATIN CALCIUM 40 MG: 40 TABLET, FILM COATED ORAL at 20:39

## 2020-04-01 RX ADMIN — HYDRALAZINE HYDROCHLORIDE 25 MG: 25 TABLET, FILM COATED ORAL at 07:37

## 2020-04-01 RX ADMIN — HYDRALAZINE HYDROCHLORIDE 20 MG: 20 INJECTION INTRAMUSCULAR; INTRAVENOUS at 20:37

## 2020-04-01 RX ADMIN — VARENICLINE TARTRATE 1 MG: 0.5 TABLET, FILM COATED ORAL at 20:38

## 2020-04-01 RX ADMIN — ASPIRIN 81 MG: 81 TABLET ORAL at 07:36

## 2020-04-01 RX ADMIN — Medication 10 ML: at 10:10

## 2020-04-01 RX ADMIN — PANTOPRAZOLE SODIUM 40 MG: 40 TABLET, DELAYED RELEASE ORAL at 16:49

## 2020-04-01 NOTE — PROGRESS NOTES
FINA PLAN:    1) Home with family  2) Patient denied for [facility] - patient out of service area  3) Follow-up appts on AVS    Chas Smith RN, BSN, [address] [place] [address]    Coordinator  [phone]

## 2020-04-01 NOTE — PROGRESS NOTES
[address], [facility]  [phone]      Cardiology Progress Note      4/1/2020 8:31 AM    Admit Date: 3/30/2020    Admit Diagnosis:   NSTEMI (non-ST elevated myocardial infarction) ([facility] [I21.4]    Subjective:     Alexandrea Clifford c/o stomach discomfort and a sore throat, what he says is the original complaint. Treated with GI cocktail which resolved symptoms for a while. Started on Protonix yesterday, increased to BID today. He is s/p negative cardiac cath. LVEDP 25, start on low dose diuretic. EF 55-60% by LV gram, echo still pending. BP remains elevated, restarting ARB as renal function improving.      Visit Vitals  BP (!) 159/115   Pulse 80   Temp 97.5 °F (36.4 °C)   Resp 18   Ht 5' 11\" (1.803 m)   Wt 124.4 kg (274 lb 4 oz)   SpO2 99%   BMI 38.25 kg/m²       Current Facility-Administered Medications   Medication Dose Route Frequency    losartan (COZAAR) tablet 50 mg  50 mg Oral DAILY    pantoprazole (PROTONIX) tablet 40 mg  40 mg Oral ACB&D    sodium chloride (NS) flush 5-40 mL  5-40 mL IntraVENous Q8H    sodium chloride (NS) flush 5-40 mL  5-40 mL IntraVENous PRN    acetaminophen (TYLENOL) tablet 650 mg  650 mg Oral Q4H PRN    varenicline (CHANTIX) 0.5 mg tablet 1 mg  1 mg Oral Q12H    aspirin delayed-release tablet 81 mg  81 mg Oral DAILY    atorvastatin (LIPITOR) tablet 40 mg  40 mg Oral QHS    metoprolol tartrate (LOPRESSOR) tablet 75 mg  75 mg Oral Q12H    amLODIPine (NORVASC) tablet 5 mg  5 mg Oral DAILY    hydrALAZINE (APRESOLINE) tablet 25 mg  25 mg Oral TID    nitroglycerin (Tridil) 200 mcg/ml infusion  0-100 mcg/min IntraVENous TITRATE    calcium carbonate (TUMS) chewable tablet 400 mg [elemental]  400 mg Oral Q6H PRN    ondansetron (ZOFRAN) injection 4 mg  4 mg IntraVENous Q4H PRN       Objective:      Physical Exam:  General Appearance:  slightly obese, young AAM in no acute distress  Chest:   Clear  Cardiovascular:  Regular rate and rhythm, no murmur. Abdomen:   Soft, non-tender, bowel sounds are active. Extremities: no peripheral edema;  Right radial site D/I, no hematoma  Skin:  Warm and dry. Data Review:   Recent Labs     03/31/20  0043   WBC 8.3   HGB 13.3   HCT 40.5   *     Recent Labs     04/01/20  0546 03/31/20  0002    139   K 3.5 3.8   * 107   CO2 25 28   GLU 85 95   BUN 17 27*   CREA 1.34* 1.99*   CA 8.0* 8.7   ALB  --  3.7   TBILI  --  0.2   SGOT  --  30   ALT  --  33   INR  --  1.0       Recent Labs     03/31/20  0636 03/31/20  0320 03/31/20  0002   TROIQ 1.28* 1.43* 1.46*         Intake/Output Summary (Last 24 hours) at 4/1/2020 1058  Last data filed at 4/1/2020 1015  Gross per 24 hour   Intake 2913.34 ml   Output 2925 ml   Net -11.66 ml        Telemetry: SR    Cardiac cath:  3/31/2020    The left ventricular size is normal. The left ventricular systolic function is normal. LV systolic pressure is normal. LV end diastolic pressure is elevated. LV EDP is: 25. The estimated EF = 55 - 60%. There are no wall motion abnormalities in the left ventricle. The outflow tract is normal.    Assessment:     Principal Problem:    NSTEMI (non-ST elevated myocardial infarction) [facility] (3/31/2020)    Active Problems:    Hypertension (3/27/2015)      Tobacco abuse (3/27/2015)      Family history of early CAD (3/31/2020)      Acute renal failure ([facility] (3/31/2020)      Class 2 obesity in adult (3/31/2020)      Severe left ventricular hypertrophy (4/1/2020)      Chronic diastolic heart failure, NYHA class 2 ([facility] (4/1/2020)        Plan:     NSTEMI Type 2:  Negative cardiac cath for significant blockages  Start Plavix x 1 month for management of NSTEMI Type 2  Continue on ASA 81mg daily, metoprolol, statin, restarting ARB today  Echo preliminary reading shows EF 45-50%, severe LVH, DHF      HTN:  Remains uncontrolled, continue BB, started Norvasc, hydralazine, and ARB.   Nitro discontinued last evening  Starting Lasix 40mg x 1 and will see how he responds - may discharge on low dose lasix      Chronic diastolic HF: (EF 22-47%, sev LVH)  Lasix today, elevated LVEDP at 25 on cardiac cath  Discussed lifestyle changes, diet, exercise and weight loss     ARF:  Improved overnight with hydration. Recommend f/u with labs within 1-2 weeks.      Tobacco abuse:  Using Chantix within last month is down to 5 cigs/day  Encouraged Cessation      Hopeful discharge today if BP improves. He has f/u with PCP 4/3/2020 and make f/u with Cardiology within 2 weeks. [facility]    4/1/2020         Patient seen, examined by me personally. Plan discussed as detailed. Agree with note as outlined by  NP. I confirm findings in history and physical exam. No additional findings noted. Agree with plan as outlined above.      Norina Lesch, MD

## 2020-04-01 NOTE — PROGRESS NOTES
Hospitalist Progress Note    NAME: Eloina Dewitt   :  1980   MRN:  [id number]     I reviewed with Dr. Suraj Souza about the medical history and the findings on the physical examination. I discussed with Dr. Suraj Souza the patient's diagnosis and concur with the plan. Interim Hospital Summary: 35 y.o. male whom presented on 3/30/2020 with chest pain. Assessment / Plan:  Type II NSTEMI  Elevated troponin (1.46->1.43->1.28)  Poorly controlled HTN  Hyperlipidemia  - A1C 5.7    TG 88, total cholesterol 133    appreciate cardiology input;     S/p cardiac cath on 3/31/2020 revealed no ischemia    Echo (2020): Severe concentric hypertrophy. Mild systolic function. Estimated left ventricular ejection fraction is 55 - 60%. Visually measured ejection fraction. Continue with BB, norvasc, and hydrialazine    IV nitroglycerin drip has been d/c'd. ARB was resumed by the cardiology team on     Pt received one time dose of IV lasix today per cardiology with concerns of severe LVH. Continue with daily ASA and statin    Continue with plavix for one month    PALAK  - Creat 1.99 -> 1.3 (was 1.48 on 10/2017). Was not following up with health care provider regularly. Will continue to monitor (resumed ARB & received IV lasix on )    received IVF. Symptomatic GERD  - Protonix BID    Tobacco abuse  - continue Chantix    Pt agreed with importance of smoking cessation    BMI 38.25 kg/m²   Obese - discussed with pt about importance of life style modification which pt agreed    Code status: Full  Prophylaxis: Hep SQ  Recommended Disposition: Home w/Family     Subjective:     Chief Complaint / Reason for Physician Visit  \"I really would like to go home\". Discussed with RN events overnight.      Review of Systems:  Symptom Y/N Comments  Symptom Y/N Comments   Fever/Chills n   Chest Pain n    Poor Appetite    Edema     Cough    Abdominal Pain     Sputum    Joint Pain     SOB/DYE n   Pruritis/Rash     Nausea/vomit n Tolerating PT/OT     Diarrhea n   Tolerating Diet     Constipation n   Other       Could NOT obtain due to:      Objective:     VITALS:   Last 24hrs VS reviewed since prior progress note. Most recent are:  Patient Vitals for the past 24 hrs:   Temp Pulse Resp BP SpO2   04/01/20 0100  85 16 157/85 96 %   04/01/20 0000 97.4 °F (36.3 °C) 76 16 136/83 96 %   03/31/20 2300  77 16 (!) 165/100 99 %   03/31/20 2200  74 16 (!) 160/107 98 %   03/31/20 2100  83 16 (!) 153/100 97 %   03/31/20 2000  77 16 (!) 153/102 99 %   03/31/20 1900 97.4 °F (36.3 °C) 75 16 (!) 160/116 98 %   03/31/20 1802  85  (!) 146/98 97 %   03/31/20 1602  76  143/85 98 %   03/31/20 1530  81  (!) 163/107 97 %   03/31/20 1515  82  (!) 154/113 98 %   03/31/20 1500  76  (!) 171/104 98 %   03/31/20 1445  77  (!) 154/98 99 %   03/31/20 1430  77  (!) 156/110 100 %   03/31/20 1415  80  (!) 160/94 98 %   03/31/20 1400  81  (!) 149/97 98 %   03/31/20 1345  80  (!) 146/91 98 %   03/31/20 1329 97.5 °F (36.4 °C) 77 15 (!) 156/102 99 %   03/31/20 1052 97.9 °F (36.6 °C) 84 18 (!) 148/105 98 %   03/31/20 0900  84  158/90 98 %   03/31/20 0855  82  (!) 145/102 99 %   03/31/20 0850    (!) 153/98    03/31/20 0845    (!) 153/101    03/31/20 0840  83  (!) 148/102 100 %   03/31/20 0835    (!) 166/109    03/31/20 0710 97.8 °F (36.6 °C) 85 14 158/90 97 %   03/31/20 0650    (!) 159/115        Intake/Output Summary (Last 24 hours) at 4/1/2020 0648  Last data filed at 4/1/2020 0224  Gross per 24 hour   Intake 2091.67 ml   Output 2400 ml   Net -308.33 ml        PHYSICAL EXAM:  General: Obese. Alert, cooperative, no acute distress    EENT:  EOMI. Anicteric sclerae. MMM  Resp:  CTA bilaterally, no wheezing or rales. No accessory muscle use  CV:  Regular  rhythm,  No edema  GI:  Soft, Non distended, Non tender. +Bowel sounds  Neurologic:  Alert and oriented X 3, normal speech,   Psych:   Good insight.  Not anxious nor agitated  Skin:  No maria e. No jaundice    Reviewed most current lab test results and cultures  YES  Reviewed most current radiology test results   YES  Review and summation of old records today    NO  Reviewed patient's current orders and MAR    YES  PMH/SH reviewed - no change compared to H&P  ________________________________________________________________________  Care Plan discussed with:    Comments   Patient y    GENNY [facility] y    Consultant                        Multidiciplinary team rounds were held today with , nursing, pharmacist and clinical coordinator. Patient's plan of care was discussed; medications were reviewed and discharge planning was addressed. ________________________________________________________________________  Caridad Mata NP     Procedures: see electronic medical records for all procedures/Xrays and details which were not copied into this note but were reviewed prior to creation of Plan. LABS:  I reviewed today's most current labs and imaging studies.   Pertinent labs include:  Recent Labs     03/31/20  0043   WBC 8.3   HGB 13.3   HCT 40.5   *     Recent Labs     04/01/20  0546 03/31/20  0002    139   K 3.5 3.8   * 107   CO2 25 28   GLU 85 95   BUN 17 27*   CREA 1.34* 1.99*   CA 8.0* 8.7   ALB  --  3.7   TBILI  --  0.2   SGOT  --  30   ALT  --  33   INR  --  1.0       Signed: )Latonia Wilkinson NP

## 2020-04-01 NOTE — PROGRESS NOTES
07:00 - Bedside report rec'd from Gabriel, [address] [place] [address]. Patient hypertensive. R radial site CDI. Nitro gtt off at [facility] per [address] [place] [address]. 07:36 - Patient given BP meds early as he remains hypertensive. 09:07. Remains hypertensive. BP 170s/110s. Given losartan. 10:10 - BP starting to improve 163/97. Will continue to monitor. 12:19 Torres Szymanski NP notified of elevated BP again. Will review meds and make changes. Do not restart nitro gtt at this time. 18:05 - /108. Parameters for prn hydralazine for SBP >180 or DBP >110. Will monitor. 19:00 - SBAR report given to JERRY Rios.

## 2020-04-01 NOTE — ROUTINE PROCESS
The following appointments have been successfully scheduled: 
 
Date/time Friday, April 03, 2020 10:00 AM 
Patient  Vannessa Archibald 1980 ([phone] Department [facility]-MAIN OFFICE-[place] [address] Appointment type Transitional Care Provider TOMAS BARNES

## 2020-04-01 NOTE — PROGRESS NOTES
1900 - Bedside report from [facility]. NSR. No complaints. NTG qtt at 30 mcg's. Headache 'improving some\". 2100 - Up to chair. 2300 - complaining of sore throat and stomach pain, \"its the same discomfort that originally made me go to the ER, but then I got there and they told me I had a heart issue and they never made any mention to my stomach since then. \"  Order for tums, will administer and monitor. Said the GI coctail taken earlier provided \"about 20-30 minutes of relief, but then it came back again. \" BP still elevated, BP agents given just before 2100, will monitor for effect. 0700 - Bedside report to RN.   NSR.

## 2020-04-01 NOTE — PROGRESS NOTES
1900 - Bedside report from [employer].  NSR. BP still up despite increase in doses today. Will monitor. No current complaints. 2045 - hydralazine IV 54KX for diasolic continually above threshold. 2200 - no significant improvement noted. 0530 - BPs remain in same range. 0700 - All am meds being given now. Diastolic 218 currently. NP on rounds and doses adjusted on bp meds. Bedside report to RN. NSR. No complaints.

## 2020-04-02 VITALS
OXYGEN SATURATION: 99 % | HEART RATE: 78 BPM | HEIGHT: 71 IN | RESPIRATION RATE: 18 BRPM | BODY MASS INDEX: 38.4 KG/M2 | SYSTOLIC BLOOD PRESSURE: 149 MMHG | WEIGHT: 274.25 LBS | TEMPERATURE: 97.6 F | DIASTOLIC BLOOD PRESSURE: 92 MMHG

## 2020-04-02 LAB
ANION GAP SERPL CALC-SCNC: 5 MMOL/L (ref 5–15)
BNP SERPL-MCNC: 529 PG/ML
BUN SERPL-MCNC: 18 MG/DL (ref 6–20)
BUN/CREAT SERPL: 12 (ref 12–20)
CALCIUM SERPL-MCNC: 8.4 MG/DL (ref 8.5–10.1)
CHLORIDE SERPL-SCNC: 108 MMOL/L (ref 97–108)
CO2 SERPL-SCNC: 26 MMOL/L (ref 21–32)
CREAT SERPL-MCNC: 1.46 MG/DL (ref 0.7–1.3)
GLUCOSE SERPL-MCNC: 84 MG/DL (ref 65–100)
POTASSIUM SERPL-SCNC: 3.4 MMOL/L (ref 3.5–5.1)
SODIUM SERPL-SCNC: 139 MMOL/L (ref 136–145)

## 2020-04-02 PROCEDURE — 80048 BASIC METABOLIC PNL TOTAL CA: CPT

## 2020-04-02 PROCEDURE — 74011250637 HC RX REV CODE- 250/637: Performed by: HOSPITALIST

## 2020-04-02 PROCEDURE — 83880 ASSAY OF NATRIURETIC PEPTIDE: CPT

## 2020-04-02 PROCEDURE — 74011250637 HC RX REV CODE- 250/637: Performed by: NURSE PRACTITIONER

## 2020-04-02 PROCEDURE — 36415 COLL VENOUS BLD VENIPUNCTURE: CPT

## 2020-04-02 RX ORDER — PANTOPRAZOLE SODIUM 40 MG/1
40 TABLET, DELAYED RELEASE ORAL
Qty: 60 TAB | Refills: 0 | Status: SHIPPED | OUTPATIENT
Start: 2020-04-02 | End: 2020-05-14 | Stop reason: SDUPTHER

## 2020-04-02 RX ORDER — ASPIRIN 81 MG/1
81 TABLET ORAL DAILY
Qty: 30 TAB | Refills: 0 | Status: SHIPPED | OUTPATIENT
Start: 2020-04-02 | End: 2020-05-14 | Stop reason: SDUPTHER

## 2020-04-02 RX ORDER — AMLODIPINE BESYLATE 10 MG/1
10 TABLET ORAL DAILY
Qty: 30 TAB | Refills: 0 | Status: SHIPPED | OUTPATIENT
Start: 2020-04-02 | End: 2020-05-14 | Stop reason: SDUPTHER

## 2020-04-02 RX ORDER — POTASSIUM CHLORIDE 750 MG/1
10 TABLET, FILM COATED, EXTENDED RELEASE ORAL
Status: COMPLETED | OUTPATIENT
Start: 2020-04-02 | End: 2020-04-02

## 2020-04-02 RX ORDER — HYDRALAZINE HYDROCHLORIDE 50 MG/1
100 TABLET, FILM COATED ORAL 3 TIMES DAILY
Status: DISCONTINUED | OUTPATIENT
Start: 2020-04-02 | End: 2020-04-02 | Stop reason: HOSPADM

## 2020-04-02 RX ORDER — HYDRALAZINE HYDROCHLORIDE 100 MG/1
100 TABLET, FILM COATED ORAL 3 TIMES DAILY
Qty: 90 TAB | Refills: 0 | Status: SHIPPED | OUTPATIENT
Start: 2020-04-02 | End: 2020-05-14 | Stop reason: SDUPTHER

## 2020-04-02 RX ORDER — METOPROLOL TARTRATE 100 MG/1
100 TABLET ORAL EVERY 12 HOURS
Qty: 60 TAB | Refills: 0 | Status: SHIPPED | OUTPATIENT
Start: 2020-04-02 | End: 2020-04-17

## 2020-04-02 RX ORDER — CLOPIDOGREL BISULFATE 75 MG/1
75 TABLET ORAL DAILY
Qty: 30 TAB | Refills: 0 | Status: SHIPPED | OUTPATIENT
Start: 2020-04-02 | End: 2020-08-19 | Stop reason: ALTCHOICE

## 2020-04-02 RX ORDER — ATORVASTATIN CALCIUM 40 MG/1
40 TABLET, FILM COATED ORAL
Qty: 30 TAB | Refills: 0 | Status: SHIPPED | OUTPATIENT
Start: 2020-04-02 | End: 2020-05-14 | Stop reason: SDUPTHER

## 2020-04-02 RX ADMIN — POTASSIUM CHLORIDE 10 MEQ: 750 TABLET, FILM COATED, EXTENDED RELEASE ORAL at 07:01

## 2020-04-02 RX ADMIN — LOSARTAN POTASSIUM 50 MG: 50 TABLET ORAL at 07:01

## 2020-04-02 RX ADMIN — HYDRALAZINE HYDROCHLORIDE 100 MG: 50 TABLET, FILM COATED ORAL at 07:00

## 2020-04-02 RX ADMIN — CLOPIDOGREL BISULFATE 75 MG: 75 TABLET ORAL at 07:01

## 2020-04-02 RX ADMIN — AMLODIPINE BESYLATE 10 MG: 5 TABLET ORAL at 06:59

## 2020-04-02 RX ADMIN — Medication 10 ML: at 05:11

## 2020-04-02 RX ADMIN — PANTOPRAZOLE SODIUM 40 MG: 40 TABLET, DELAYED RELEASE ORAL at 06:59

## 2020-04-02 RX ADMIN — METOPROLOL TARTRATE 100 MG: 50 TABLET, FILM COATED ORAL at 06:59

## 2020-04-02 RX ADMIN — ASPIRIN 81 MG: 81 TABLET ORAL at 07:01

## 2020-04-02 RX ADMIN — VARENICLINE TARTRATE 1 MG: 0.5 TABLET, FILM COATED ORAL at 08:49

## 2020-04-02 NOTE — TELEPHONE ENCOUNTER
Skip Gutierrez would like to schedule a virtual appointment with patient.  Patient should not be coming in office

## 2020-04-02 NOTE — DISCHARGE INSTRUCTIONS
Patient Discharge Instructions     Pt Name  Briana Hernandez   Date of Birth 1980   Age  35 y.o. Medical Record Number  [id number]   PCP Inell RUSH Maynard    Admit date:  3/30/2020 @    [place] [address]    Room Number  6383/72   Date of Discharge 4/2/2020     Admission Diagnoses:     NSTEMI (non-ST elevated myocardial infarction) [facility]        No Known Allergies     You were admitted to [address] for  NSTEMI (non-ST elevated myocardial infarction) ([facility]    YOUR OTHER MEDICAL DIAGNOSES INCLUDE (BUT NOT LIMITED TO ):  Present on Admission:   NSTEMI (non-ST elevated myocardial infarction) ([facility]   Hypertension   Tobacco abuse   Family history of early CAD   Acute renal failure ([place] [address].)   Class 2 obesity in adult   Severe left ventricular hypertrophy   Chronic diastolic heart failure, NYHA class 2 ([place] [address].)    Patient Education   Patient Education   Patient Education   Amlodipine (By mouth)   Amlodipine (jh-YBQ-qa-peen)  Treats high blood pressure and angina (chest pain). This medicine is a calcium channel blocker. Brand Name(s): Norvasc   There may be other brand names for this medicine. When This Medicine Should Not Be Used: This medicine is not right for everyone. Do not use it if you had an allergic reaction to amlodipine. How to Use This Medicine:   Tablet, Dissolving Tablet  · Take your medicine as directed. Your dose may need to be changed several times to find what works best for you. Take this medicine at the same time each day. · Read and follow the patient instructions that come with this medicine. Talk to your doctor or pharmacist if you have any questions. · Missed dose: Take a dose as soon as you remember. If it has been more than 12 hours since you were supposed to take your dose, skip the missed dose and take your next regular dose at the regular time.   · Store the medicine in a closed container at room temperature, away from heat, moisture, and direct light. Drugs and Foods to Avoid:   Ask your doctor or pharmacist before using any other medicine, including over-the-counter medicines, vitamins, and herbal products. · Some medicines can affect how amlodipine works. Tell your doctor if you are also using any of the following:   ¨ Clarithromycin, cyclosporine, diltiazem, itraconazole, ritonavir, sildenafil, simvastatin, tacrolimus  Warnings While Using This Medicine:   · Tell your doctor if you are pregnant or breastfeeding, or if you have liver disease, heart disease, coronary artery disease, or aortic stenosis. · This medicine could lower your blood pressure too much, especially when you first use it or if you are dehydrated. Stand or sit up slowly if you feel lightheaded or dizzy. · Your doctor will check your progress and the effects of this medicine at regular visits. Keep all appointments. · Do not stop using this medicine without asking your doctor, even if you feel well. This medicine will not cure high blood pressure, but it will help keep it in normal range. You may have to take blood pressure medicine for the rest of your life. · Keep all medicine out of the reach of children. Never share your medicine with anyone. Possible Side Effects While Using This Medicine:   Call your doctor right away if you notice any of these side effects:  · Allergic reaction: Itching or hives, swelling in your face or hands, swelling or tingling in your mouth or throat, chest tightness, trouble breathing  · Lightheadedness, dizziness  · New or worsening chest pain  · Swelling in your hands, ankles, or legs  · Trouble breathing, nausea, unusual sweating, fainting  If you notice other side effects that you think are caused by this medicine, tell your doctor. Call your doctor for medical advice about side effects.  You may report side effects to FDA at 1-555-FDA-2976  © 2017 [facility] Information is for End User's use only and may not be sold, redistributed or otherwise used for commercial purposes. The above information is an  only. It is not intended as medical advice for individual conditions or treatments. Talk to your doctor, nurse or pharmacist before following any medical regimen to see if it is safe and effective for you. Hydralazine (By mouth)   Hydralazine Hydrochloride (tbh-REAE-t-zeen luis carlos-droe-KLOR-josé)  Treats high blood pressure. Brand Name(s):   There may be other brand names for this medicine. When This Medicine Should Not Be Used: This medicine is not right for everyone. Do not use it if you had an allergic reaction to hydralazine, or if you have coronary artery disease or rheumatic heart disease. How to Use This Medicine:   Tablet  · Take your medicine as directed. Your dose may need to be changed several times to find what works best for you. · Missed dose: Take a dose as soon as you remember. If it is almost time for your next dose, wait until then and take a regular dose. Do not take extra medicine to make up for a missed dose. · Store the medicine in a closed container at room temperature, away from heat, moisture, and direct light. Drugs and Foods to Avoid:   Ask your doctor or pharmacist before using any other medicine, including over-the-counter medicines, vitamins, and herbal products. · Some foods and medicines can affect how hydralazine works. Tell your doctor if you are using diazoxide or an MAO inhibitor. Warnings While Using This Medicine:   · Tell your doctor if you are pregnant or breastfeeding, or if you have kidney disease, heart or blood vessel disease, heart rhythm problems, lupus, or if you had a heart attack or stroke. · This medicine may cause the following problems:  ¨ Lupus-like syndrome  ¨ Changes in heart rhythm  ¨ Nerve problems  · This medicine may lower your blood pressure too much and cause you to feel dizzy.  Do not drive or do anything else that could be dangerous until you know how this medicine affects you. · Your doctor will do lab tests at regular visits to check on the effects of this medicine. Keep all appointments. · Keep all medicine out of the reach of children. Never share your medicine with anyone. Possible Side Effects While Using This Medicine:   Call your doctor right away if you notice any of these side effects:  · Allergic reaction: Itching or hives, swelling in your face or hands, swelling or tingling in your mouth or throat, chest tightness, trouble breathing  · Change in how much or how often you urinate  · Chest pain that may spread to your arms, jaw, back, or neck, trouble breathing, unusual sweating, faintness  · Fast, pounding, or uneven heartbeat  · Fever, chills, cough, sore throat, and body aches  · Lightheadedness, dizziness, or fainting  · Numbness, tingling, or burning pain in your hands, arms, legs, or feet  · Unusual bleeding, bruising, or weakness  If you notice these less serious side effects, talk with your doctor:   · Diarrhea, nausea, vomiting, loss of appetite  · Headache  · Stuffy nose or watery eyes  If you notice other side effects that you think are caused by this medicine, tell your doctor. Call your doctor for medical advice about side effects. You may report side effects to FDA at 2-554-TGA-4461  © 2017 [facility] Information is for End User's use only and may not be sold, redistributed or otherwise used for commercial purposes. The above information is an  only. It is not intended as medical advice for individual conditions or treatments. Talk to your doctor, nurse or pharmacist before following any medical regimen to see if it is safe and effective for you. Clopidogrel (By mouth)   Clopidogrel (mugg-ROX-pf-grel)  Helps prevent stroke, heart attack, and other heart problems. This medicine is a platelet inhibitor. Brand Name(s): Plavix   There may be other brand names for this medicine.   When This Medicine Should Not Be Used: This medicine is not right for everyone. Do not use it if you had an allergic reaction to clopidogrel. How to Use This Medicine:   Tablet  · Your doctor will tell you how much medicine to use. Do not use more than directed. · This medicine should come with a Medication Guide. Ask your pharmacist for a copy if you do not have one. · Missed dose: Take a dose as soon as you remember. If it is almost time for your next dose, wait until then and take a regular dose. Do not take extra medicine to make up for a missed dose. · Store the medicine in a closed container at room temperature, away from heat, moisture, and direct light. Drugs and Foods to Avoid:   Ask your doctor or pharmacist before using any other medicine, including over-the-counter medicines, vitamins, and herbal products. · Some medicines can affect how clopidogrel works. Tell your doctor if you are using any of the following:   ¨ Esomeprazole, omeprazole, repaglinide, or warfarin  ¨ Medicine to treat depression  ¨ NSAID pain or arthritis medicine (including celecoxib, diclofenac, ibuprofen, or naproxen)  Warnings While Using This Medicine:   · Tell your doctor if you are pregnant or breastfeeding, or if you have bleeding problems, stomach ulcer or bleeding, a recent stroke, or have a history of bleeding problems. · This medicine can cause you to bleed and bruise more easily. Take precautions to avoid injury. Brush and floss your teeth gently, do not play rough sports, and be careful with sharp objects. Severe bleeding can be life-threatening. · This medicine may cause a rare but serious blood clotting condition called thrombotic thrombocytopenic purpura. · Make sure any doctor or dentist who treats you knows that you are using this medicine. Tell your doctor if you plan to have surgery or a dental procedure. · Do not stop using this medicine suddenly.  Your doctor will need to slowly decrease your dose before you stop it completely. · Your doctor will check your progress and the effects of this medicine at regular visits. Keep all appointments. · Keep all medicine out of the reach of children. Never share your medicine with anyone. Possible Side Effects While Using This Medicine:   Call your doctor right away if you notice any of these side effects:  · Allergic reaction: Itching or hives, swelling in your face or hands, swelling or tingling in your mouth or throat, chest tightness, trouble breathing  · Bloody or black, tarry stools  · Nosebleeds  · Pinpoint red or purple spots on your skin or in your mouth  · Problems with vision, speech, or walking  · Red or dark brown urine  · Seizures  · Severe stomach pain  · Trouble breathing, tiredness, fast heartbeat, yellow skin or eyes  · Unusual bleeding, bruising, or weakness  · Vomiting of blood or vomit that looks like coffee grounds  If you notice other side effects that you think are caused by this medicine, tell your doctor. Call your doctor for medical advice about side effects. You may report side effects to FDA at 7-085-FDA-3275  © 2017 [facility] Information is for End User's use only and may not be sold, redistributed or otherwise used for commercial purposes. The above information is an  only. It is not intended as medical advice for individual conditions or treatments. Talk to your doctor, nurse or pharmacist before following any medical regimen to see if it is safe and effective for you. DIET:  Cardiac Diet       Recommended activity: Activity as tolerated  Follow up :    Follow-up Information     Follow up With Specialties Details Why Contact Info    Bree Johns NP Pediatrics Go on 4/3/2020 For PCP - hospital follow up appointment at 10:00AM  [address] [facility] [address] [place] [address]      Nba Anderson MD Cardiology, [address] [place] [address] Vascular Surgery, Internal Medicine  2 weeks [phone] [facility]  [phone]                           Cardiac Catheterization/Angiography Discharge Instructions    *Check the puncture site frequently for swelling or bleeding. If you see any bleeding, lie down and apply pressure over the area with a clean town or washcloth. Notify your doctor for any redness, swelling, drainage or oozing from the puncture site. Notify your doctor for any fever or chills. *If the leg or arm with the puncture becomes cold, numb or painful, call Dr Negra Arita at [phone]    *Activity should be limited for the next 48 hours. Climb stairs as little as possible and avoid any stooping, bending or strenuous activity for 48 hours. No heavy lifting (anything over 10 pounds) for three days. *Do not drive for 48 hours. *You may resume your usual diet. Drink more fluids than usual.    *Have a responsible person drive you home and stay with you for at least 24 hours after your heart catheterization/angiography. *You may remove the bandage from your right arm in 24 hours. You may shower in 24 hours. No tub baths, hot tubs or swimming for one week. Do not place any lotions, creams, powders, ointments over the puncture site for one week. You may place a clean band-aid over the puncture site each day for 5 days. Change this daily. · It is important that you take the medication exactly as they are prescribed. · Keep your medication in the bottles provided by the pharmacist and keep a list of the medication names, dosages, and times to be taken in your wallet. · Do not take other medications without consulting your doctor.        ADDITIONAL INFORMATION: If you experience any of the following symptoms or have any health problem not listed below, then please call your primary care physician or return to the emergency room if you cannot get hold of your doctor: Fever, chills, nausea, vomiting, diarrhea, change in mentation, falling, bleeding, shortness of breath. I understand that if any problems occur once I am discharged, I am supposed to call my Primary care physician for further care or seek help in the Emergency Department at the nearest Healthcare facility. I have had an opportunity to discuss my clinical issues with my doctor and nursing staff. I understand and acknowledge receipt of the above instructions.                                                                                                                                            Physician's or R.N.'s Signature                                                            Date/Time                                                                                                                                              Patient or Representative Signature                                                 Date/Time

## 2020-04-02 NOTE — PROGRESS NOTES
CM acknowledged discharge order. FINA  Home with family. H2H is not available in patient location - patient has been informed. Follow up appts on AVS    CM tasks are completel. Nursing informed.     Damari Rees RN CM  Ext [phone]

## 2020-04-02 NOTE — PROGRESS NOTES
Rebeca Milliganineau, [address] [facility]  [phone]      Cardiology Progress Note      4/2/2020 8:31 AM    Admit Date: 3/30/2020    Admit Diagnosis:   NSTEMI (non-ST elevated myocardial infarction) ([facility] [I21.4]    Subjective:     Óscar Kaiser has no c/o CP, SOB, stomach discomfort. Normal bowel movement. He is s/p negative cardiac cath. LVEDP 25, received lasix yesterday with neg 3.4L / 24hrs. EF 55-60%    BP remains elevated, restarting ARB as renal function improving. Visit Vitals  /68   Pulse 81   Temp 97.6 °F (36.4 °C)   Resp 18   Ht 5' 11\" (1.803 m)   Wt 124.4 kg (274 lb 4 oz)   SpO2 95%   BMI 38.25 kg/m²       Current Facility-Administered Medications   Medication Dose Route Frequency    hydrALAZINE (APRESOLINE) tablet 100 mg  100 mg Oral TID    losartan (COZAAR) tablet 50 mg  50 mg Oral DAILY    pantoprazole (PROTONIX) tablet 40 mg  40 mg Oral ACB&D    clopidogreL (PLAVIX) tablet 75 mg  75 mg Oral DAILY    amLODIPine (NORVASC) tablet 10 mg  10 mg Oral DAILY    metoprolol tartrate (LOPRESSOR) tablet 100 mg  100 mg Oral Q12H    hydrALAZINE (APRESOLINE) 20 mg/mL injection 20 mg  20 mg IntraVENous Q6H PRN    sodium chloride (NS) flush 5-40 mL  5-40 mL IntraVENous Q8H    sodium chloride (NS) flush 5-40 mL  5-40 mL IntraVENous PRN    acetaminophen (TYLENOL) tablet 650 mg  650 mg Oral Q4H PRN    varenicline (CHANTIX) 0.5 mg tablet 1 mg  1 mg Oral Q12H    aspirin delayed-release tablet 81 mg  81 mg Oral DAILY    atorvastatin (LIPITOR) tablet 40 mg  40 mg Oral QHS    calcium carbonate (TUMS) chewable tablet 400 mg [elemental]  400 mg Oral Q6H PRN    ondansetron (ZOFRAN) injection 4 mg  4 mg IntraVENous Q4H PRN       Objective:      Physical Exam:  General Appearance:  slightly obese, young AAM in no acute distress  Chest:   Clear  Cardiovascular:  Regular rate and rhythm, no murmur. Abdomen:   Soft, non-tender, bowel sounds are active.    Extremities: no peripheral edema;  Right radial site D/I, no hematoma  Skin:  Warm and dry. Data Review:   Recent Labs     03/31/20  0043   WBC 8.3   HGB 13.3   HCT 40.5   *     Recent Labs     04/02/20  0439 04/01/20  0546 03/31/20  0002    141 139   K 3.4* 3.5 3.8    111* 107   CO2 26 25 28   GLU 84 85 95   BUN 18 17 27*   CREA 1.46* 1.34* 1.99*   CA 8.4* 8.0* 8.7   ALB  --   --  3.7   TBILI  --   --  0.2   SGOT  --   --  30   ALT  --   --  33   INR  --   --  1.0       Recent Labs     03/31/20  0636 03/31/20  0320 03/31/20  0002   TROIQ 1.28* 1.43* 1.46*         Intake/Output Summary (Last 24 hours) at 4/2/2020 0956  Last data filed at 4/2/2020 0710  Gross per 24 hour   Intake 240 ml   Output 3100 ml   Net -2860 ml        Telemetry: SR      Echo: 4/1/2020  · Normal cavity size. Severe concentric hypertrophy. Mild systolic function. Estimated left ventricular ejection fraction is 55 - 60%. Visually measured ejection fraction. · Mild aortic valve regurgitation is present. · Mild to moderate mitral valve regurgitation is present. · Severely elevated central venous pressure (15+ mmHg); IVC diameter is larger than 21 mm and collapses less than 50% with respiration. Cardiac cath:  3/31/2020    The left ventricular size is normal. The left ventricular systolic function is normal. LV systolic pressure is normal. LV end diastolic pressure is elevated. LV EDP is: 25. The estimated EF = 55 - 60%. There are no wall motion abnormalities in the left ventricle.  The outflow tract is normal.    Assessment:     Principal Problem:    NSTEMI (non-ST elevated myocardial infarction) ([facility] (3/31/2020)    Active Problems:    Hypertension (3/27/2015)      Tobacco abuse (3/27/2015)      Family history of early CAD (3/31/2020)      Acute renal failure ([facility] (3/31/2020)      Class 2 obesity in adult (3/31/2020)      Severe left ventricular hypertrophy (4/1/2020)      Chronic diastolic heart failure, NYHA class 2 ([facility] (4/1/2020)        Plan:     NSTEMI Type 2:  Negative cardiac cath for significant blockages  Continue on Plavix x 1 month for management of NSTEMI Type 2  Continue on ASA 81mg daily, metoprolol, statin, ARB       HTN:  Better controlled, continue BB, Norvasc, hydralazine, and ARB. Hydralazine increased to 100mg TID this AM     Chronic diastolic HF: (EF 38-49%, sev LVH)  Lasix yesterday with neg 3.5L, plus as 3 outputs not captured. Elevated LVEDP at 25 on cardiac cath  Discussed lifestyle changes, diet, exercise and weight loss     ARF:  Stable   Recommend f/u with labs within 1-2 weeks.      Tobacco abuse:  Using Chantix within last month is down to 5 cigs/day  Encouraged Cessation      He has f/u with PCP 4/3/2020 and make f/u with Cardiology within 2 weeks. Winifred Max [address] Cardiology    4/2/2020         Patient seen, examined by me personally. Plan discussed as detailed. Agree with note as outlined by  NP. I confirm findings in history and physical exam. No additional findings noted. Agree with plan as outlined above.      Leyla Ladd MD

## 2020-04-02 NOTE — DISCHARGE SUMMARY
Hospitalist Discharge Summary     Patient ID:  Julee Fletcher  [id number]  35 y.o.  1980    PCP on record: Luisa Dennis NP    Admit date: 3/30/2020  Discharge date and time: 4/2/2020      DISCHARGE DIAGNOSIS:  Type II NSTEMI  Chronic diastolic HF, NYHA class 2  Elevated troponin (1.46->1.43->1.28)  Poorly controlled HTN  Hyperlipidemia  PALAK  Symptomatic GERD  Tobacco abuse  BMI 38.25 kg/m²   Obese - discussed with pt about importance of life style modification which pt agreed           CONSULTATIONS:  IP CONSULT TO CARDIOLOGY  IP CONSULT TO HOSPITALIST    Excerpted HPI from H&P of Venancio Hough MD:  35 y.o man with HTN and tobacco use who presents with chest pain. Symptoms began five days ago. He describes left-sided and substernal chest pressure that radiates to his jaw. It occurs with eating and with physical exertion. It is associated with nausea, vomiting, and dyspnea. No fever or cough. He went to [facility] and was transferred here after being found to have an elevated serum troponin. He is currently pain-free on a heparin drip and nitroglycerin. He works as a , is sedentary, and eats out every day.    ______________________________________________________________________  DISCHARGE SUMMARY/HOSPITAL COURSE:  for full details see H&P, daily progress notes, labs, consult notes. 35year old male admitted with 5 day history of chest pain. His cardiac cath revealed no ischemia on 3/31/2020. His cardiac cath also revealed that his LV end diastolic pressure is elevated. LV EDP is: 25. The estimated EF = 55 - 60%. Pt has been educated by both cardiology and hospitalist team in regards importance of managing hypertension and follow up with provider regularly.  Below are the detail medical illness that he was treated during this hospitalization;    Type II NSTEMI  Elevated troponin (1.46->1.43->1.28)  Chronic diastolic HF, NYHA class 2  Poorly controlled HTN  Hyperlipidemia  - A1C 5.7    TG 88, total cholesterol 133    appreciate cardiology input;     S/p cardiac cath on 3/31/2020 revealed no ischemia    Echo (4/1/2020): Severe concentric hypertrophy. Mild systolic function. Estimated left ventricular ejection fraction is 55 - 60%. Visually measured ejection fraction. Continue with BB, ARB, norvasc, and hydrialazine    NT pro-     Continue with daily ASA and statin    Continue with plavix for one month     PALAK  - Creat 1.99 -> 1.3 -> 1.4; this seemed to be his baseline (was 1.48 on 10/2017). Was not following up with health care provider regularly. Pt has a fup appt with pcp tomorrow.     Symptomatic GERD  - Protonix BID     Tobacco abuse  - continue Chantix    Pt agreed with importance of smoking cessation     BMI 38.25 kg/m²   Obese - discussed with pt about importance of life style modification which pt agreed             _______________________________________________________________________  Patient seen and examined by me on discharge day. Pertinent Findings:  Gen:    Not in distress  Chest: Clear lungs  CVS:   Regular rhythm. No edema  Abd:  Soft, not distended, not tender  Neuro:  Alert, orient x 4  _______________________________________________________________________  DISCHARGE MEDICATIONS:   Current Discharge Medication List      START taking these medications    Details   aspirin delayed-release 81 mg tablet Take 1 Tab by mouth daily. Qty: 30 Tab, Refills: 0      atorvastatin (LIPITOR) 40 mg tablet Take 1 Tab by mouth nightly. Qty: 30 Tab, Refills: 0      pantoprazole (PROTONIX) 40 mg tablet Take 1 Tab by mouth Before breakfast and dinner. Qty: 60 Tab, Refills: 0      clopidogreL (PLAVIX) 75 mg tab Take 1 Tab by mouth daily. Qty: 30 Tab, Refills: 0      amLODIPine (NORVASC) 10 mg tablet Take 1 Tab by mouth daily.   Qty: 30 Tab, Refills: 0      metoprolol tartrate (LOPRESSOR) 100 mg IR tablet Take 1 Tab by mouth every twelve (12) hours.  Qty: 60 Tab, Refills: 0      hydrALAZINE (APRESOLINE) 100 mg tablet Take 1 Tab by mouth three (3) times daily. Qty: 90 Tab, Refills: 0         CONTINUE these medications which have NOT CHANGED    Details   varenicline (CHANTIX) 1 mg tablet Take 1 Tab by mouth two (2) times a day. Qty: 60 Tab, Refills: 1    Associated Diagnoses: Tobacco abuse      losartan (COZAAR) 100 mg tablet Take 1 Tab by mouth daily. Qty: 90 Tab, Refills: 1    Associated Diagnoses: Essential hypertension             My Recommended Diet, Activity, Wound Care, and follow-up labs are listed in the patient's Discharge Insturctions which I have personally completed and reviewed. _______________________________________________________________________  DISPOSITION:    Home with Family: y   Home with HH/PT/OT/RN:    SNF/LTC:    MICHAEL:    OTHER:        Condition at Discharge:  Stable  _______________________________________________________________________  Follow up with:   PCP : Quoc Rojas NP  Follow-up Information     Follow up With Specialties Details Why Contact Info    Quoc Rojas NP Pediatrics Go on 4/3/2020 For PCP - hospital follow up appointment at 10:00AM  [address] [place] [address]      Alta Jacques MD Cardiology, [facility] Surgery, Internal Medicine In 2 weeks Cardiology - please call the office to schedule a virtual visit.  [address] [place] [address]  [phone]                Total time in minutes spent coordinating this discharge (includes going over instructions, follow-up, prescriptions, and preparing report for sign off to her PCP) :  40 minutes    Signed:  Latonia Morales NP

## 2020-04-02 NOTE — PROGRESS NOTES
07:00 - Bedside report rec'd from [facility]. Hypertensive. AM meds given. 08:52 - /68! Patient without complaint of dizziness. 11:27 - Ambulated entire length of hallway without CP/SOB/dizziness. /92 post activity. Elvis Hodges NP made aware. 12:30 - Discharge instructions given to patient. Verbalizes understanding. PIVs and tele removed.

## 2020-04-02 NOTE — PROGRESS NOTES
Libra Brown To whom it may concern:       RE: Analilia Mandel (YOB: 1980)        Dear Michel:    This is to certify that the above referenced patient was hospitalized at [facility] from 3/30/2020 through 4/2/2020. He is expected to go home and follow up with his Primary Care physician on April 3, 2020. Final work release note will be provided by the primary care physician after the visit. It is recommended that Mr. Analilia Mandel should be excused from work until 4/3/2020. If you have questions please feel to contact the [facility] hospitalist office at [facility] at [phone].      Sincerely       ________________________________________  Juve Dickerson, CLEMENT, FNP-C, APRN-BC  Hospitalist, [place] [address] [facility], [place], [address]  Tel: [phone]

## 2020-04-03 ENCOUNTER — PATIENT OUTREACH (OUTPATIENT)
Dept: INTERNAL MEDICINE CLINIC | Age: 40
End: 2020-04-03

## 2020-04-03 ENCOUNTER — VIRTUAL VISIT (OUTPATIENT)
Dept: FAMILY MEDICINE CLINIC | Age: 40
End: 2020-04-03

## 2020-04-03 VITALS — WEIGHT: 270 LBS | BODY MASS INDEX: 37.8 KG/M2 | HEIGHT: 71 IN

## 2020-04-03 DIAGNOSIS — I10 ESSENTIAL HYPERTENSION: ICD-10-CM

## 2020-04-03 DIAGNOSIS — Z72.0 TOBACCO ABUSE: ICD-10-CM

## 2020-04-03 DIAGNOSIS — Z09 HOSPITAL DISCHARGE FOLLOW-UP: Primary | ICD-10-CM

## 2020-04-03 DIAGNOSIS — I21.4 NSTEMI (NON-ST ELEVATED MYOCARDIAL INFARCTION) (HCC): ICD-10-CM

## 2020-04-03 NOTE — PROGRESS NOTES
Chief Complaint   Patient presents with    Transitions Of Care     1. Have you been to the ER, urgent care clinic since your last visit? Hospitalized since your last visit? Yes When: Pt went to hospital for MI    2. Have you seen or consulted any other health care providers outside of the [address] [place] [address] since your last visit? Include any pap smears or colon screening. No      Health maintenance reviewed. Pt informed of health maintenance past due and/or upcoming. Pt verbalized understanding. Health Maintenance Due   Topic Date Due    Pneumococcal 0-64 years (1 of 1 - PPSV23) 08/23/1985    DTaP/Tdap/Td series (1 - Tdap) 08/23/2000       Pt has f/u with cardiology on 4/16/2020.

## 2020-04-03 NOTE — PROGRESS NOTES
Consent: Micheal Mosqudea, who was seen by synchronous (real-time) audio-video technology, and/or his healthcare decision maker, is aware that this patient-initiated, Telehealth encounter on 4/3/2020 is a billable service, with coverage as determined by his insurance carrier. He is aware that he may receive a bill and has provided verbal consent to proceed: Yes. 712  Subjective:   Micheal Mosqueda is a 35 y.o. male who was seen for Transitions Of Care    He was recently admitted to [address] [place], [place] [address]y:  Admit date: 3/30/2020  Discharge date and time: 4/2/2020     DISCHARGE DIAGNOSIS:  Type II NSTEMI  Chronic diastolic HF, NYHA class 2  Elevated troponin (1.46->1.43->1.28)  Poorly controlled HTN  Hyperlipidemia  PALAK  Symptomatic GERD  Tobacco abuse    Excerpted HPI from H&P of Joel Dunbar MD:  35 y.o man with HTN and tobacco use who presents with chest pain. Symptoms began five days ago. He describes left-sided and substernal chest pressure that radiates to his jaw. It occurs with eating and with physical exertion. It is associated with nausea, vomiting, and dyspnea. No fever or cough. He went to [facility] and was transferred here after being found to have an elevated serum troponin. He is currently pain-free on a heparin drip and nitroglycerin. He works as a , is sedentary, and eats out every day.     ______________________________________________________________________  DISCHARGE SUMMARY/HOSPITAL COURSE:  for full details see H&P, daily progress notes, labs, consult notes.      35year old male admitted with 5 day history of chest pain. His cardiac cath revealed no ischemia on 3/31/2020. His cardiac cath also revealed that his LV end diastolic pressure is elevated. LV EDP is: 25. The estimated EF = 55 - 60%. Pt has been educated by both cardiology and hospitalist team in regards importance of managing hypertension and follow up with provider regularly.  Below are the detail medical illness that he was treated during this hospitalization;     Type II NSTEMI  Elevated troponin (1.46->1.43->1.28)  Chronic diastolic HF, NYHA class 2  Poorly controlled HTN  Hyperlipidemia  - A1C 5.7    TG 88, total cholesterol 133    appreciate cardiology input;     S/p cardiac cath on 3/31/2020 revealed no ischemia    Echo (4/1/2020): Severe concentric hypertrophy. Mild systolic function. Estimated left ventricular ejection fraction is 55 - 60%. Visually measured ejection fraction.    Continue with BB, ARB, norvasc, and hydrialazine    NT pro-     Continue with daily ASA and statin    Continue with plavix for one month     PALAK  - Creat 1.99 -> 1.3 -> 1.4; this seemed to be his baseline (was 1.48 on 10/2017).   Was not following up with health care provider regularly. Pt has a fup appt with pcp tomorrow.     Symptomatic GERD  - Protonix BID     Tobacco abuse  - continue Chantix    Pt agreed with importance of smoking cessation           Says that he is taking all the medication as ordered. No side effects noted. No swelling in his legs. He does not have a BP cuff at home but plans to purchase one today. He will start monitoring his blood pressure and call if elevated. Has follow up with cardiology on 4/16/20. He is watching salt intake. Drinking plenty of water. No chest pain since home. No dizziness. Occasionally has SOB but this this not new. Right radial site from cardiac cath is healing well. Smoking- quit smoking Monday, when hospitalized. Still taking chantix, on second month. Says that has helped a lot. Does have some \"crazy dreams\" but not intolerable. No other side effects, no SI/HI. Was 1.5 pack per day and then started the chantix in February and he says that he had decreased to 6 per day prior to hospitalization and now he has not smoked at all since Monday. Prior to Admission medications    Medication Sig Start Date End Date Taking?  Authorizing Provider   atorvastatin (LIPITOR) 40 mg tablet Take 1 Tab by mouth nightly. 4/2/20  Yes Latonia Quiñones NP   pantoprazole (PROTONIX) 40 mg tablet Take 1 Tab by mouth Before breakfast and dinner. 4/2/20  Yes Latonia Quiñones NP   clopidogreL (PLAVIX) 75 mg tab Take 1 Tab by mouth daily. 4/2/20  Yes Latonia Quiñones NP   amLODIPine (NORVASC) 10 mg tablet Take 1 Tab by mouth daily. 4/2/20  Yes Latonia Quiñones NP   metoprolol tartrate (LOPRESSOR) 100 mg IR tablet Take 1 Tab by mouth every twelve (12) hours. 4/2/20  Yes Latonia Quiñones NP   hydrALAZINE (APRESOLINE) 100 mg tablet Take 1 Tab by mouth three (3) times daily. 4/2/20  Yes Latonia Quiñones NP   varenicline (CHANTIX) 1 mg tablet Take 1 Tab by mouth two (2) times a day. 3/20/20  Yes Vaishali Arriaga NP   losartan (COZAAR) 100 mg tablet Take 1 Tab by mouth daily. 2/14/20  Yes Vaishali Arriaga NP   aspirin delayed-release 81 mg tablet Take 1 Tab by mouth daily.  4/2/20   Janis NINA NP     No Known Allergies    Patient Active Problem List    Diagnosis Date Noted    Severe left ventricular hypertrophy 04/01/2020    Chronic diastolic heart failure, NYHA class 2 ([address] 04/01/2020    NSTEMI (non-ST elevated myocardial infarction) ([address] 03/31/2020    Family history of early CAD 03/31/2020    Acute renal failure ([address] 03/31/2020    Class 2 obesity in adult 03/31/2020    Severe obesity ([address] 02/14/2020    Hypertension 03/27/2015    Tobacco abuse 03/27/2015    Toenail fungus 03/27/2015     Past Medical History:   Diagnosis Date    Acute renal failure ([address] 3/31/2020    Chronic diastolic heart failure, NYHA class 2 ([address] 4/1/2020    Elevated BP     Family history of early CAD 3/31/2020    Severe left ventricular hypertrophy 4/1/2020    Tobacco abuse      Past Surgical History:   Procedure Laterality Date    HX ORTHOPAEDIC Left 1997    ankle       ROS  Gen: no fatigue, fever, chills  Eyes: no excessive tearing, itching, or discharge  Nose: no rhinorrhea, no sinus pain  Mouth: no oral lesions, no sore throat  Resp: no new shortness of breath, no wheezing, no cough  CV: no chest pain, no paroxysmal nocturnal dyspnea  Abd: no nausea, no heartburn, no diarrhea, no constipation, no abdominal pain  Neuro: no headaches, no syncope or presyncopal episodes  Endo: no polyuria, no polydipsia  Heme: no lymphadenopathy, no easy bruising or bleeding        Objective:   Vital Signs: (As obtained by patient/caregiver at home)  Visit Vitals  Ht 5' 11\" (1.803 m)   Wt 270 lb (122.5 kg)   BMI 37.66 kg/m²        [INSTRUCTIONS:  \"[x]\" Indicates a positive item  \"[]\" Indicates a negative item  -- DELETE ALL ITEMS NOT EXAMINED]    Constitutional: [x] Appears well-developed and well-nourished [x] No apparent distress        Mental status: [x] Alert and awake  [x] Oriented to person/place/time [x] Able to follow commands         Eyes:   EOM    -  Normal      Sclera  [x]  Normal              Discharge [x]  None visible       HENT: [x] Normocephalic, atraumatic    [x] Mouth/Throat: Mucous membranes are moist        Neck: [x] No visualized mass      Pulmonary/Chest: [x] Respiratory effort normal   [x] No visualized signs of difficulty breathing or respiratory distress        Musculoskeletal:   [x] Normal gait with no signs of ataxia         [x] Normal range of motion of neck       Neurological:        [x] No Facial Asymmetry (Cranial nerve 7 motor function) (limited exam due to video visit)          [x] No gaze palsy                  Skin:        [x] No significant exanthematous lesions or discoloration noted on facial skin      Right radial site healing well, no signs of hematoma noted. Psychiatric:       [x] Normal Affect        [x] No Hallucinations          Assessment & Plan:   Differential diagnosis and treatment options reviewed with patient who is in agreement with treatment plan as outlined below. ICD-10-CM ICD-9-CM    1. Hospital discharge follow-up Z09 V67.59    2.  Essential hypertension I10 401.9    3. NSTEMI (non-ST elevated myocardial infarction) ([facility] I21.4 410.70    4. Tobacco abuse Z72.0 305.1      Has not been able to monitor BP at home yet, plans to purchase BP monitor so he can check home BP. Call if BP elevated. Continue current medications for now. DASH diet discussed and encouraged. Discussed slowly advancing activity. Will discuss cardiac rehab with cardiology. Praised on smoking cessation efforts. Continue chantix for now. Will see cardiology for follow up in 2 weeks and will have repeat labs and follow up then. I spent at least 25 minutes with this established patient, and >50% of the time was spent counseling and/or coordinating care regarding hospital discharge. We discussed the expected course, resolution and complications of the diagnosis(es) in detail. Medication risks, benefits, costs, interactions, and alternatives were discussed as indicated. I advised him to contact the office if his condition worsens, changes or fails to improve as anticipated. He expressed understanding with the diagnosis(es) and plan. Poncho Martel is a 35 y.o. male being evaluated by a video visit encounter for concerns as above. A caregiver was present when appropriate. Due to this being a TeleHealth encounter (During [facility] public health emergency), evaluation of the following organ systems was limited: Vitals/Constitutional/EENT/Resp/CV/GI//MS/Neuro/Skin/Heme-Lymph-Imm. Pursuant to the emergency declaration under the [facility] waiver authority and the [employer] Act, this Virtual  Visit was conducted, with patient's (and/or legal guardian's) consent, to reduce the patient's risk of exposure to COVID-19 and provide necessary medical care. Services were provided through a video synchronous discussion virtually to substitute for in-person clinic visit.    Patient and provider were located at their individual homes.         Chad Elaine NP

## 2020-04-03 NOTE — PROGRESS NOTES
COVID-19 Screening Initial Follow-up Note    Patient contacted regarding COVID-19 risk. Care Transition Nurse/ Ambulatory Care Manager contacted the patient by telephone to perform post discharge assessment. Verified name and  with patient as identifiers. Provided introduction to self, and explanation of the CTN/ACM role, and reason for call due to risk factors for infection and/or exposure to COVID-19. Symptoms reviewed with patient who verbalized the following symptoms:   Fever no    Fatigue no   Pain or aching joints no  Cough no  Shortness of breath no    Confusion or unusual change in mental status no    Chills or shaking no    Sweating no    Fast heart rate no    Fast breathing no    Dizziness/lightheadedness no    Less urine output no    Cold, clammy, and pale skin no  Low body temperature no          Patient has following risk factors of: none CTN/ACM reviewed discharge instructions, medical action plan and red flags such as increased shortness of breath, increasing fever and signs of decompensation with patient who verbalized understanding. Discussed exposure protocols and quarantine with CDC Guidelines What to do if you are sick with coronavirus disease 2019 patient who was given an opportunity for questions and concerns. The patient agrees to contact the [facility] exposure line, local health department and PCP office for questions related to their healthcare. CTN provided contact information for future reference.     Reviewed and educated patient on any new and changed medications related to discharge diagnosis     Plan for follow-up call in 14 days based on severity of symptoms and risk factors

## 2020-04-16 ENCOUNTER — PATIENT OUTREACH (OUTPATIENT)
Dept: INTERNAL MEDICINE CLINIC | Age: 40
End: 2020-04-16

## 2020-04-16 NOTE — PROGRESS NOTES
Patient resolved from Transition of Care episode on 4/2. Patient/family has been provided the following resources and education related to COVID-19:                         Signs, symptoms and red flags related to COVID-19            CDC exposure and quarantine guidelines            [facility] exposure contact - [phone]            Contact for their local Department of Health                 Patient currently reports that the following symptoms have improved:  no symptoms     No further outreach scheduled with this CTN/ACM. Episode of Care resolved. Patient has this CTN/ACM contact information if future needs arise.

## 2020-04-17 ENCOUNTER — VIRTUAL VISIT (OUTPATIENT)
Dept: CARDIOLOGY CLINIC | Age: 40
End: 2020-04-17

## 2020-04-17 DIAGNOSIS — I50.32 CHRONIC DIASTOLIC HEART FAILURE, NYHA CLASS 2 (HCC): Primary | ICD-10-CM

## 2020-04-17 DIAGNOSIS — R37 SEXUAL DYSFUNCTION: ICD-10-CM

## 2020-04-17 DIAGNOSIS — I10 ESSENTIAL HYPERTENSION: ICD-10-CM

## 2020-04-17 RX ORDER — METOPROLOL TARTRATE 100 MG/1
50 TABLET ORAL 2 TIMES DAILY
Qty: 60 TAB | Refills: 0
Start: 2020-04-17 | End: 2020-05-14 | Stop reason: SDUPTHER

## 2020-04-17 RX ORDER — HYDROCHLOROTHIAZIDE 25 MG/1
25 TABLET ORAL DAILY
Qty: 30 TAB | Refills: 3 | Status: SHIPPED | OUTPATIENT
Start: 2020-04-17 | End: 2020-05-14 | Stop reason: SDUPTHER

## 2020-04-17 NOTE — PROGRESS NOTES
Cardiology Telemedicine Encounter    Poncho Martel is a 35 y.o. male who was seen by synchronous (real-time) audio-video technology on 4/17/2020           Subjective/HPI:     Poncho Martel is a 35 y.o. male is here for routine follow-up via virtual visit. He has a PMHx of NSTEMI, normal coronaries by cath 03/20. [place] be secondary to severe, poorly controlled HTN. His BP is well controlled. He denies any chest pain, SOB. C/o sexual dysfunction. Previously stopped meds because of this. Has quit smoking. PCP Provider  Pilar Silverio NP    Past Medical History:   Diagnosis Date    Acute renal failure ([facility] 3/31/2020    Chronic diastolic heart failure, NYHA class 2 ([facility] 4/1/2020    Elevated BP     Family history of early CAD 3/31/2020    Severe left ventricular hypertrophy 4/1/2020    Tobacco abuse         No Known Allergies     Outpatient Encounter Medications as of 4/17/2020   Medication Sig Dispense Refill    aspirin delayed-release 81 mg tablet Take 1 Tab by mouth daily. 30 Tab 0    atorvastatin (LIPITOR) 40 mg tablet Take 1 Tab by mouth nightly. 30 Tab 0    pantoprazole (PROTONIX) 40 mg tablet Take 1 Tab by mouth Before breakfast and dinner. 60 Tab 0    clopidogreL (PLAVIX) 75 mg tab Take 1 Tab by mouth daily. 30 Tab 0    amLODIPine (NORVASC) 10 mg tablet Take 1 Tab by mouth daily. 30 Tab 0    metoprolol tartrate (LOPRESSOR) 100 mg IR tablet Take 1 Tab by mouth every twelve (12) hours. 60 Tab 0    hydrALAZINE (APRESOLINE) 100 mg tablet Take 1 Tab by mouth three (3) times daily. 90 Tab 0    varenicline (CHANTIX) 1 mg tablet Take 1 Tab by mouth two (2) times a day. 60 Tab 1    losartan (COZAAR) 100 mg tablet Take 1 Tab by mouth daily. 90 Tab 1     No facility-administered encounter medications on file as of 4/17/2020. Review of Symptoms:    Review of Systems   Constitutional: Negative. Negative for chills and fever. HENT: Negative.   Negative for hearing loss.    Respiratory: Negative. Negative for cough, hemoptysis and shortness of breath. Cardiovascular: Negative. Negative for chest pain, palpitations, orthopnea, claudication, leg swelling and PND. Gastrointestinal: Negative. Negative for nausea and vomiting. Musculoskeletal: Negative for myalgias. Skin: Negative. Negative for rash. Neurological: Negative. Negative for dizziness, loss of consciousness and headaches. Physical Exam:      General: Well developed, in no acute distress, cooperative and alert  HEENT: trach is midline. PEERL, EOM intact. Respiratory: No audible wheezing, no acute respiratory distress, normal effort of breathing  Extremities:  No cyanosis, atraumatic. No lower extremity edema. Neuro: A&Ox3, speech clear  Skin: Skin color is normal. No rashes or lesions. Non diaphoretic  Due to this being a TeleHealth evaluation, many elements of the physical examination are unable to be assessed. Cardiology Labs:    Lab Results   Component Value Date/Time    Cholesterol, total 133 03/31/2020 03:20 AM    HDL Cholesterol 44 03/31/2020 03:20 AM    LDL, calculated 71.4 03/31/2020 03:20 AM    VLDL, calculated 17.6 03/31/2020 03:20 AM    CHOL/HDL Ratio 3.0 03/31/2020 03:20 AM       Lab Results   Component Value Date/Time    Hemoglobin A1c 5.7 (H) 03/31/2020 03:20 AM       Lab Results   Component Value Date/Time    Sodium 139 04/02/2020 04:39 AM    Potassium 3.4 (L) 04/02/2020 04:39 AM    Chloride 108 04/02/2020 04:39 AM    CO2 26 04/02/2020 04:39 AM    Glucose 84 04/02/2020 04:39 AM    BUN 18 04/02/2020 04:39 AM    Creatinine 1.46 (H) 04/02/2020 04:39 AM    BUN/Creatinine ratio 12 04/02/2020 04:39 AM    GFR est AA >60 04/02/2020 04:39 AM    GFR est non-AA 53 (L) 04/02/2020 04:39 AM    Calcium 8.4 (L) 04/02/2020 04:39 AM    Anion gap 5 04/02/2020 04:39 AM    Bilirubin, total 0.2 03/31/2020 12:02 AM    ALT (SGPT) 33 03/31/2020 12:02 AM    AST (SGOT) 30 03/31/2020 12:02 AM    Alk. phosphatase 52 03/31/2020 12:02 AM    Protein, total 7.7 03/31/2020 12:02 AM    Albumin 3.7 03/31/2020 12:02 AM    Globulin 4.0 03/31/2020 12:02 AM    A-G Ratio 0.9 (L) 03/31/2020 12:02 AM          Assessment:       ICD-10-CM ICD-9-CM    1. Chronic diastolic heart failure, NYHA class 2 (HCC) I50.32 428.32    2. Essential hypertension I10 401.9    3. Sexual dysfunction R37 302.70         Plan:     1. Chronic diastolic heart failure, NYHA class 2 (HCC)  Stable, secondary to hypertensive heart disease. Severe LVH on echo. Not on diuretic. 2. Essential hypertension  Well controlled on current therapy per home readings. 3. Sexual dysfunction  Will decrease metoprolol. Add HCTZ. He has quit smoking, congratulated. Can stop plavix after a month. F/u n a month to re-evaluate. Dale Lizama MD        This visit was completed using [employer] Virtual Visit telemedicine services    Pursuant to the emergency declaration under the [facility] waiver authority and the Coronavirus Preparedness and [employer] Act, this Virtual Visit was conducted, with patient's consent, to reduce the patient's risk of exposure to COVID-19 and provide continuity of care for an established patient. Services were provided through a video synchronous discussion virtually to substitute for in-person clinic visit.

## 2020-04-17 NOTE — PROGRESS NOTES
Chief Complaint   Patient presents with   [address] Patient   [facility] Follow Up     3/30-4/2/20 for NSTEMI- Left heart cath done on 3/31/20-    Shortness of Breath     a little upon exertion      Does not have access to BP monitor     129/84 this morning . Mj Dawn ...

## 2020-05-14 ENCOUNTER — TELEPHONE (OUTPATIENT)
Dept: FAMILY MEDICINE CLINIC | Age: 40
End: 2020-05-14

## 2020-05-14 DIAGNOSIS — I10 ESSENTIAL HYPERTENSION: ICD-10-CM

## 2020-05-14 RX ORDER — LOSARTAN POTASSIUM 100 MG/1
100 TABLET ORAL DAILY
Qty: 90 TAB | Refills: 1 | Status: SHIPPED | OUTPATIENT
Start: 2020-05-14 | End: 2020-08-19 | Stop reason: SDDI

## 2020-05-14 RX ORDER — HYDRALAZINE HYDROCHLORIDE 100 MG/1
100 TABLET, FILM COATED ORAL 3 TIMES DAILY
Qty: 90 TAB | Refills: 0 | Status: SHIPPED | OUTPATIENT
Start: 2020-05-14 | End: 2020-06-11

## 2020-05-14 RX ORDER — PANTOPRAZOLE SODIUM 40 MG/1
40 TABLET, DELAYED RELEASE ORAL
Qty: 60 TAB | Refills: 0 | Status: SHIPPED | OUTPATIENT
Start: 2020-05-14 | End: 2020-06-11

## 2020-05-14 RX ORDER — METOPROLOL TARTRATE 100 MG/1
50 TABLET ORAL 2 TIMES DAILY
Qty: 60 TAB | Refills: 0 | Status: SHIPPED | OUTPATIENT
Start: 2020-05-14 | End: 2020-07-30 | Stop reason: SDUPTHER

## 2020-05-14 RX ORDER — ATORVASTATIN CALCIUM 40 MG/1
40 TABLET, FILM COATED ORAL
Qty: 30 TAB | Refills: 0 | Status: SHIPPED | OUTPATIENT
Start: 2020-05-14 | End: 2020-06-11

## 2020-05-14 RX ORDER — HYDROCHLOROTHIAZIDE 25 MG/1
25 TABLET ORAL DAILY
Qty: 30 TAB | Refills: 3 | Status: SHIPPED | OUTPATIENT
Start: 2020-05-14 | End: 2021-06-08 | Stop reason: SDUPTHER

## 2020-05-14 RX ORDER — AMLODIPINE BESYLATE 10 MG/1
10 TABLET ORAL DAILY
Qty: 30 TAB | Refills: 0 | Status: SHIPPED | OUTPATIENT
Start: 2020-05-14 | End: 2020-06-11

## 2020-05-14 RX ORDER — ASPIRIN 81 MG/1
81 TABLET ORAL DAILY
Qty: 30 TAB | Refills: 0 | Status: SHIPPED | OUTPATIENT
Start: 2020-05-14 | End: 2020-06-12

## 2020-05-14 NOTE — TELEPHONE ENCOUNTER
Called pt and verified name and . Voiced to him that medications were all sent in. He voiced understanding. Pt voiced that he quit smoking so he no longer needs chantix.

## 2020-05-14 NOTE — TELEPHONE ENCOUNTER
Pt is calling stating he needs an refill on all his med except the blood thinners and states he has been out of med since Monday and he needs you to fill all his med he didn't know the names just said everything but the blood thinners

## 2020-05-14 NOTE — TELEPHONE ENCOUNTER
Refills sent. Have him double check that he has them all. I did not refill chantix, is he still taking?

## 2020-06-11 RX ORDER — ATORVASTATIN CALCIUM 40 MG/1
TABLET, FILM COATED ORAL
Qty: 30 TAB | Refills: 0 | Status: SHIPPED | OUTPATIENT
Start: 2020-06-11 | End: 2021-06-08 | Stop reason: SDUPTHER

## 2020-06-11 RX ORDER — AMLODIPINE BESYLATE 10 MG/1
TABLET ORAL
Qty: 30 TAB | Refills: 0 | Status: SHIPPED | OUTPATIENT
Start: 2020-06-11 | End: 2021-06-08 | Stop reason: SDUPTHER

## 2020-06-11 RX ORDER — HYDRALAZINE HYDROCHLORIDE 100 MG/1
TABLET, FILM COATED ORAL
Qty: 90 TAB | Refills: 0 | Status: SHIPPED | OUTPATIENT
Start: 2020-06-11 | End: 2020-08-19 | Stop reason: SDDI

## 2020-06-11 RX ORDER — PANTOPRAZOLE SODIUM 40 MG/1
TABLET, DELAYED RELEASE ORAL
Qty: 60 TAB | Refills: 0 | Status: SHIPPED | OUTPATIENT
Start: 2020-06-11 | End: 2021-06-28

## 2020-06-12 RX ORDER — ASPIRIN 81 MG/1
TABLET ORAL
Qty: 30 TAB | Refills: 0 | Status: SHIPPED | OUTPATIENT
Start: 2020-06-12 | End: 2021-06-28

## 2020-07-30 RX ORDER — METOPROLOL TARTRATE 100 MG/1
50 TABLET ORAL 2 TIMES DAILY
Qty: 60 TAB | Refills: 0 | Status: SHIPPED | OUTPATIENT
Start: 2020-07-30 | End: 2021-06-08 | Stop reason: SDUPTHER

## 2020-07-30 NOTE — TELEPHONE ENCOUNTER
Requested Prescriptions     Pending Prescriptions Disp Refills    metoprolol tartrate (LOPRESSOR) 100 mg IR tablet 60 Tab 0     Sig: Take 0.5 Tabs by mouth two (2) times a day.

## 2020-08-19 ENCOUNTER — OFFICE VISIT (OUTPATIENT)
Dept: FAMILY MEDICINE CLINIC | Age: 40
End: 2020-08-19

## 2020-08-19 ENCOUNTER — VIRTUAL VISIT (OUTPATIENT)
Dept: FAMILY MEDICINE CLINIC | Age: 40
End: 2020-08-19
Payer: COMMERCIAL

## 2020-08-19 DIAGNOSIS — I10 ESSENTIAL HYPERTENSION: Primary | ICD-10-CM

## 2020-08-19 DIAGNOSIS — I21.4 NSTEMI (NON-ST ELEVATED MYOCARDIAL INFARCTION) (HCC): ICD-10-CM

## 2020-08-19 DIAGNOSIS — Z72.0 TOBACCO ABUSE: ICD-10-CM

## 2020-08-19 PROCEDURE — 99214 OFFICE O/P EST MOD 30 MIN: CPT | Performed by: NURSE PRACTITIONER

## 2020-08-19 RX ORDER — VARENICLINE TARTRATE 25 MG
KIT ORAL
Qty: 1 DOSE PACK | Refills: 0 | Status: SHIPPED | OUTPATIENT
Start: 2020-08-19 | End: 2021-06-28

## 2020-08-19 NOTE — PROGRESS NOTES
Subjective:       Veronique Cabello is a 35 y.o. male who was seen by synchronous (real-time) audio-video technology on 8/19/2020 for Medication Evaluation    He had cardiology virtual follow up 4/17/2020. Says he has been off his medications for the past month. Tommy Milligany note said he could stop taking plavix after one month, which he did. Patient notes he did not understand what medication changes he was suppose to do. BP at home \"been okay, I have not checked this week but last wee it was 120 something over 90\". No CP or SOB or swelling or palpitations. No headache or dizziness. Prior to Admission medications    Medication Sig Start Date End Date Taking? Authorizing Provider   metoprolol tartrate (LOPRESSOR) 100 mg IR tablet Take 0.5 Tabs by mouth two (2) times a day. 7/30/20  Yes Vaishali Arriaga NP   aspirin delayed-release 81 mg tablet TAKE 1 TABLET BY MOUTH DAILY 6/12/20  Yes Vaishali Arriaga NP   hydrALAZINE (APRESOLINE) 100 mg tablet TAKE 1 TABLET BY MOUTH THREE TIMES DAILY 6/11/20  Yes Vaishali Arriaga NP   amLODIPine (NORVASC) 10 mg tablet TAKE 1 TABLET BY MOUTH DAILY 6/11/20  Yes Vaishali Arriaga NP   pantoprazole (PROTONIX) 40 mg tablet TAKE 1 TABLET BY MOUTH BEFORE BREAKFAST AND DINNER 6/11/20  Yes Vaishali Arriaga NP   atorvastatin (LIPITOR) 40 mg tablet TAKE 1 TABLET BY MOUTH EVERY NIGHT 6/11/20  Yes Vaishali Arriaga NP   hydroCHLOROthiazide (HYDRODIURIL) 25 mg tablet Take 1 Tab by mouth daily. 5/14/20  Yes Vaishali Arriaga NP   losartan (COZAAR) 100 mg tablet Take 1 Tab by mouth daily. 5/14/20  Yes Vaishali Arriaga NP   clopidogreL (PLAVIX) 75 mg tab Take 1 Tab by mouth daily. 4/2/20  Yes Latonia Quiñones NP   varenicline (CHANTIX) 1 mg tablet Take 1 Tab by mouth two (2) times a day.  3/20/20  Yes Governor Debbie NP     Patient Active Problem List    Diagnosis Date Noted    Severe left ventricular hypertrophy 04/01/2020    Chronic diastolic heart failure, NYHA class 2 ([facility] 04/01/2020    NSTEMI (non-ST elevated myocardial infarction) ([facility] 03/31/2020    Family history of early CAD 03/31/2020    [facility] 03/31/2020    Class 2 obesity in adult 03/31/2020    Severe obesity ([facility] 02/14/2020    Hypertension 03/27/2015    Tobacco abuse 03/27/2015    Toenail fungus 03/27/2015     Past Medical History:   Diagnosis Date    Acute renal failure ([facility] 3/31/2020    Chronic diastolic heart failure, NYHA class 2 ([facility] 4/1/2020    Elevated BP     Family history of early CAD 3/31/2020    Severe left ventricular hypertrophy 4/1/2020    Tobacco abuse      Past Surgical History:   Procedure Laterality Date    HX ORTHOPAEDIC Left 1997    ankle       ROS  Gen: no fatigue, fever, chills  Eyes: no excessive tearing, itching, or discharge  Nose: no rhinorrhea, no sinus pain  Mouth: no oral lesions, no sore throat  Resp: no shortness of breath, no wheezing, no cough  CV: no chest pain, no paroxysmal nocturnal dyspnea  Abd: no nausea, no heartburn, no diarrhea, no constipation, no abdominal pain  Neuro: no headaches, no syncope or presyncopal episodes  Endo: no polyuria, no polydipsia  Heme: no lymphadenopathy, no easy bruising or bleeding    Objective:   No flowsheet data found.      [INSTRUCTIONS:  \"[x]\" Indicates a positive item  \"[]\" Indicates a negative item  -- DELETE ALL ITEMS NOT EXAMINED]    Constitutional: [x] Appears well-developed and well-nourished [x] No apparent distress       -     Mental status: [x] Alert and awake  [x] Oriented to person/place/time [x] Able to follow commands        Eyes:   EOM    [x]  Normal      Sclera  [x]  Normal              Discharge [x]  None visible        HENT: [x] Normocephalic, atraumatic    [x] Mouth/Throat: Mucous membranes are moist        Neck: [x] No visualized mass      Pulmonary/Chest: [x] Respiratory effort normal   [x] No visualized signs of difficulty breathing or respiratory distress         Musculoskeletal:   [x] Normal gait with no signs of ataxia         [x] Normal range of motion of neck             Neurological:        [x] No Facial Asymmetry (Cranial nerve 7 motor function) (limited exam due to video visit)          [x] No gaze palsy               Skin:        [x] No significant exanthematous lesions or discoloration noted on facial skin       Psychiatric:       [x] Normal Affect        [x] No Hallucinations            Assessment & Plan:   Diagnoses and all orders for this visit:    ICD-10-CM ICD-9-CM    1. Essential hypertension  I10 401.9    2. Tobacco abuse  Z72.0 305.1 varenicline (CHANTIX STARTER MAISHA) 0.5 mg (11)- 1 mg (42) DsPk   3. NSTEMI (non-ST elevated myocardial infarction) ([facility]  I21.4 410.70        1. Essential hypertension-not checking BP at home    2. Tobacco abuse  -     varenicline (CHANTIX STARTER MAISHA) 0.5 mg (11)- 1 mg (42) DsPk; Per pack instructions    3. NSTEMI (non-ST elevated myocardial infarction) ([facility]    needs to restart medication secondary to recent NSTEMI and LVH  Not checking BP at home but says last week his BP was pretty good off all medications. Has been off all medicine for 1-2 month  Will restart medications- all except losartan and hydroxyzine (will hold those for now). Will have him come in Friday at 64 for nurse visit to check BP. May need to add back those medications. Will restart chantix. He restarted smoking but had done well on the chantix. We discussed the expected course, resolution and complications of the diagnosis(es) in detail. Medication risks, benefits, costs, interactions, and alternatives were discussed as indicated. I advised him to contact the office if his condition worsens, changes or fails to improve as anticipated. He expressed understanding with the diagnosis(es) and plan.        Danyelle Goldstein, who was evaluated through a patient-initiated, synchronous (real-time) audio-video encounter, and/or his healthcare decision maker, is aware that it is a billable service, with coverage as determined by his insurance carrier. He provided verbal consent to proceed: Yes, and patient identification was verified. It was conducted pursuant to the emergency declaration under the [address] [place] [address] authority and the [employer] Act. A caregiver was present when appropriate. Ability to conduct physical exam was limited. I was at home. The patient was at home.       Lavinia Oneill NP

## 2020-08-19 NOTE — PROGRESS NOTES
Chief Complaint   Patient presents with    Medication Evaluation     1. Have you been to the ER, urgent care clinic since your last visit? Hospitalized since your last visit? No    2. Have you seen or consulted any other health care providers outside of the [address] [place] [address] since your last visit? Include any pap smears or colon screening.  No    Health Maintenance Due   Topic Date Due    Pneumococcal 0-64 years (1 of 1 - PPSV23) 08/23/1985    DTaP/Tdap/Td series (1 - Tdap) 08/23/2000    Influenza Age 5 to Adult  08/01/2020

## 2020-08-19 NOTE — PROGRESS NOTES
Chief Complaint   Patient presents with    Medication Evaluation     1. Have you been to the ER, urgent care clinic since your last visit? Hospitalized since your last visit? No    2. Have you seen or consulted any other health care providers outside of the [address] [place] [address] since your last visit? Include any pap smears or colon screening.  No    Health Maintenance Due   Topic Date Due    Pneumococcal 0-64 years (1 of 1 - PPSV23) 08/23/1985    DTaP/Tdap/Td series (1 - Tdap) 08/23/2000    Influenza Age 5 to Adult  08/01/2020

## 2020-08-21 ENCOUNTER — CLINICAL SUPPORT (OUTPATIENT)
Dept: FAMILY MEDICINE CLINIC | Age: 40
End: 2020-08-21

## 2020-08-21 VITALS — SYSTOLIC BLOOD PRESSURE: 143 MMHG | DIASTOLIC BLOOD PRESSURE: 103 MMHG | HEART RATE: 86 BPM

## 2020-08-21 DIAGNOSIS — I10 ESSENTIAL HYPERTENSION: ICD-10-CM

## 2020-08-21 RX ORDER — LOSARTAN POTASSIUM 100 MG/1
100 TABLET ORAL DAILY
Qty: 90 TAB | Refills: 1 | Status: SHIPPED | OUTPATIENT
Start: 2020-08-21 | End: 2021-06-08 | Stop reason: SDUPTHER

## 2020-08-21 NOTE — PROGRESS NOTES
BP not at goal per Los Treviño NP. Advised to restart losartan and recheck in 1 week as a nurse visit, he voiced understanding.

## 2021-06-08 ENCOUNTER — OFFICE VISIT (OUTPATIENT)
Dept: CARDIOLOGY CLINIC | Age: 41
End: 2021-06-08

## 2021-06-08 VITALS
BODY MASS INDEX: 39.03 KG/M2 | HEIGHT: 71 IN | DIASTOLIC BLOOD PRESSURE: 160 MMHG | RESPIRATION RATE: 18 BRPM | HEART RATE: 108 BPM | SYSTOLIC BLOOD PRESSURE: 210 MMHG | OXYGEN SATURATION: 98 % | WEIGHT: 278.8 LBS

## 2021-06-08 DIAGNOSIS — I50.32 CHRONIC DIASTOLIC HEART FAILURE, NYHA CLASS 2 (HCC): Primary | ICD-10-CM

## 2021-06-08 DIAGNOSIS — E78.3 MIXED HYPERGLYCERIDEMIA: ICD-10-CM

## 2021-06-08 DIAGNOSIS — I10 ESSENTIAL HYPERTENSION: ICD-10-CM

## 2021-06-08 PROCEDURE — 93000 ELECTROCARDIOGRAM COMPLETE: CPT | Performed by: INTERNAL MEDICINE

## 2021-06-08 PROCEDURE — 99214 OFFICE O/P EST MOD 30 MIN: CPT | Performed by: INTERNAL MEDICINE

## 2021-06-08 RX ORDER — AMLODIPINE BESYLATE 10 MG/1
10 TABLET ORAL DAILY
Qty: 90 TABLET | Refills: 3 | Status: SHIPPED | OUTPATIENT
Start: 2021-06-08 | End: 2022-02-10 | Stop reason: ALTCHOICE

## 2021-06-08 RX ORDER — HYDROCHLOROTHIAZIDE 25 MG/1
25 TABLET ORAL DAILY
Qty: 90 TABLET | Refills: 3 | Status: SHIPPED | OUTPATIENT
Start: 2021-06-08

## 2021-06-08 RX ORDER — ATORVASTATIN CALCIUM 40 MG/1
40 TABLET, FILM COATED ORAL DAILY
Qty: 90 TABLET | Refills: 3 | Status: SHIPPED | OUTPATIENT
Start: 2021-06-08

## 2021-06-08 RX ORDER — LOSARTAN POTASSIUM 100 MG/1
100 TABLET ORAL DAILY
Qty: 90 TABLET | Refills: 1 | Status: SHIPPED | OUTPATIENT
Start: 2021-06-08 | End: 2022-04-11

## 2021-06-08 RX ORDER — METOPROLOL TARTRATE 100 MG/1
50 TABLET ORAL 2 TIMES DAILY
Qty: 60 TABLET | Refills: 0 | Status: SHIPPED | OUTPATIENT
Start: 2021-06-08 | End: 2021-06-28 | Stop reason: SDUPTHER

## 2021-06-08 NOTE — LETTER
6/9/2021 Patient: Chance Hussein YOB: 1980 Date of Visit: 6/8/2021 Raghav Paz NP 
[address] [facility] [address] [address] [employer] Dear Raghav Paz NP, Thank you for referring Mr. Candi Wick to [facility] for evaluation. My notes for this consultation are attached. If you have questions, please do not hesitate to call me. I look forward to following your patient along with you. Sincerely, Denise Arredondo MD

## 2021-06-08 NOTE — PROGRESS NOTES
1. Have you been to the ER, urgent care clinic since your last visit? Hospitalized since your last visit? Yes, ER, 12/3/20, Cellulitis of left lower leg    2. Have you seen or consulted any other health care providers outside of the [address] [place] [address] since your last visit? Include any pap smears or colon screening.  No           Chief Complaint   Patient presents with    Hypertension     C/O  CP, SOB,Dizziness, Left leg swelling

## 2021-06-08 NOTE — PROGRESS NOTES
Subjective/HPI:     Osman Rincon is a 38 y.o. male is here for routine f/u. He has a PMHx of NSTEMI, HTN. Normal cath last year, MI felt to be type2 secondary to severe HTN. He has been off meds for 6 months. Per patient he was not able to see pcp, found our clinic number recently and made appointment. C/o SOB. No chest pain, edema. PCP Provider  Jennifer Ross NP     Past Medical History:   Diagnosis Date    Acute renal failure ([facility] 3/31/2020    Chronic diastolic heart failure, NYHA class 2 ([facility] 4/1/2020    Elevated BP     Essential hypertension     Family history of early CAD 3/31/2020    Severe left ventricular hypertrophy 4/1/2020    Tobacco abuse         No Known Allergies     Outpatient Encounter Medications as of 6/8/2021   Medication Sig Dispense Refill    losartan (COZAAR) 100 mg tablet Take 1 Tablet by mouth daily. 90 Tablet 1    metoprolol tartrate (LOPRESSOR) 100 mg IR tablet Take 0.5 Tablets by mouth two (2) times a day. 60 Tablet 0    hydroCHLOROthiazide (HYDRODIURIL) 25 mg tablet Take 1 Tablet by mouth daily. 90 Tablet 3    atorvastatin (LIPITOR) 40 mg tablet Take 1 Tablet by mouth daily. 90 Tablet 3    amLODIPine (NORVASC) 10 mg tablet Take 1 Tablet by mouth daily. 90 Tablet 3    [DISCONTINUED] losartan (COZAAR) 100 mg tablet Take 1 Tab by mouth daily. (Patient not taking: Reported on 6/8/2021) 90 Tab 1    varenicline (CHANTIX STARTER MAISHA) 0.5 mg (11)- 1 mg (42) DsPk Per pack instructions (Patient not taking: Reported on 6/8/2021) 1 Dose Pack 0    [DISCONTINUED] metoprolol tartrate (LOPRESSOR) 100 mg IR tablet Take 0.5 Tabs by mouth two (2) times a day.  (Patient not taking: Reported on 6/8/2021) 60 Tab 0    aspirin delayed-release 81 mg tablet TAKE 1 TABLET BY MOUTH DAILY (Patient not taking: Reported on 6/8/2021) 30 Tab 0    pantoprazole (PROTONIX) 40 mg tablet TAKE 1 TABLET BY MOUTH BEFORE BREAKFAST AND DINNER (Patient not taking: Reported on 6/8/2021) 60 Tab 0    [DISCONTINUED] amLODIPine (NORVASC) 10 mg tablet TAKE 1 TABLET BY MOUTH DAILY (Patient not taking: Reported on 6/8/2021) 30 Tab 0    [DISCONTINUED] atorvastatin (LIPITOR) 40 mg tablet TAKE 1 TABLET BY MOUTH EVERY NIGHT (Patient not taking: Reported on 6/8/2021) 30 Tab 0    [DISCONTINUED] hydroCHLOROthiazide (HYDRODIURIL) 25 mg tablet Take 1 Tab by mouth daily. (Patient not taking: Reported on 6/8/2021) 30 Tab 3     No facility-administered encounter medications on file as of 6/8/2021. Review of Symptoms:    ROS    Physical Exam:      General: Well developed, in no acute distress, cooperative and alert  HEENT: No carotid bruits, no JVD, trach is midline. Neck Supple, PEERL, EOM intact. Heart:  reg rate and rhythm; normal S1/S2; no murmurs, no gallops or rubs. Respiratory: Clear bilaterally x 4, no wheezing or rales  Abdomen:   Soft, non-tender, no distention, no masses. + BS. Extremities:  Normal cap refill, no cyanosis, atraumatic. No edema. Neuro: A&Ox3, speech clear, gait stable. Skin: Skin color is normal. No rashes or lesions.  Non diaphoretic  Vascular: 2+ pulses symmetric in all extremities    Visit Vitals  BP (!) 210/160 (BP 1 Location: Right upper arm, BP Patient Position: Standing, BP Cuff Size: Large adult)   Pulse (!) 108   Resp 18   Ht 5' 11\" (1.803 m)   Wt 278 lb 12.8 oz (126.5 kg)   SpO2 98%   BMI 38.88 kg/m²       ECG: sinus, LVH    Cardiology Labs:    Lab Results   Component Value Date/Time    Cholesterol, total 133 03/31/2020 03:20 AM    HDL Cholesterol 44 03/31/2020 03:20 AM    LDL, calculated 71.4 03/31/2020 03:20 AM    VLDL, calculated 17.6 03/31/2020 03:20 AM    CHOL/HDL Ratio 3.0 03/31/2020 03:20 AM       Lab Results   Component Value Date/Time    Hemoglobin A1c 5.7 (H) 03/31/2020 03:20 AM       Lab Results   Component Value Date/Time    Sodium 139 04/02/2020 04:39 AM    Potassium 3.4 (L) 04/02/2020 04:39 AM    Chloride 108 04/02/2020 04:39 AM    CO2 26 04/02/2020 04:39 AM    Glucose 84 04/02/2020 04:39 AM    BUN 18 04/02/2020 04:39 AM    Creatinine 1.46 (H) 04/02/2020 04:39 AM    BUN/Creatinine ratio 12 04/02/2020 04:39 AM    GFR est AA >60 04/02/2020 04:39 AM    GFR est non-AA 53 (L) 04/02/2020 04:39 AM    Calcium 8.4 (L) 04/02/2020 04:39 AM    Anion gap 5 04/02/2020 04:39 AM    Bilirubin, total 0.2 03/31/2020 12:02 AM    ALT (SGPT) 33 03/31/2020 12:02 AM    Alk. phosphatase 52 03/31/2020 12:02 AM    Protein, total 7.7 03/31/2020 12:02 AM    Albumin 3.7 03/31/2020 12:02 AM    Globulin 4.0 03/31/2020 12:02 AM    A-G Ratio 0.9 (L) 03/31/2020 12:02 AM          Assessment:       ICD-10-CM ICD-9-CM    1. Chronic diastolic heart failure, NYHA class 2 ([facility])  I50.32 428.32 ECHO ADULT COMPLETE      METABOLIC PANEL, COMPREHENSIVE   2. Essential hypertension  I10 401.9 AMB POC EKG ROUTINE W/ 12 LEADS, INTER & REP      losartan (COZAAR) 100 mg tablet   3. Mixed hyperglyceridemia  E78.3 272.3 LIPID PANEL        Plan:     1. Essential hypertension  Poorly controlled due to medical non compliance. Medications refilled. Check renal function. Had PALAK last year. - AMB POC EKG ROUTINE W/ 12 LEADS, INTER & REP    2. Chronic diastolic heart failure, NYHA class 2 ([address]  Probably secondry severe HTN. Check echo. Resume medications.  - ECHO ADULT COMPLETE; Future  - METABOLIC PANEL, COMPREHENSIVE; Future    3. Mixed hyperglyceridemia  Check labs. Resume statin. - LIPID PANEL; Future    F/u 2-3 weeks. Discussed low salt diet, exercise. , tobacco cessation.       Karen Tang MD

## 2021-06-12 LAB
ALBUMIN SERPL-MCNC: 4.6 G/DL (ref 4–5)
ALBUMIN/GLOB SERPL: 1.5 {RATIO} (ref 1.2–2.2)
ALP SERPL-CCNC: 58 IU/L (ref 48–121)
ALT SERPL-CCNC: 20 IU/L (ref 0–44)
AST SERPL-CCNC: 20 IU/L (ref 0–40)
BILIRUB SERPL-MCNC: 0.3 MG/DL (ref 0–1.2)
BUN SERPL-MCNC: 33 MG/DL (ref 6–24)
BUN/CREAT SERPL: 18 (ref 9–20)
CALCIUM SERPL-MCNC: 9.5 MG/DL (ref 8.7–10.2)
CHLORIDE SERPL-SCNC: 101 MMOL/L (ref 96–106)
CHOLEST SERPL-MCNC: 154 MG/DL (ref 100–199)
CO2 SERPL-SCNC: 22 MMOL/L (ref 20–29)
CREAT SERPL-MCNC: 1.84 MG/DL (ref 0.76–1.27)
GLOBULIN SER CALC-MCNC: 3.1 G/DL (ref 1.5–4.5)
GLUCOSE SERPL-MCNC: 95 MG/DL (ref 65–99)
HDLC SERPL-MCNC: 50 MG/DL
IMP & REVIEW OF LAB RESULTS: NORMAL
INTERPRETATION: NORMAL
LDLC SERPL CALC-MCNC: 83 MG/DL (ref 0–99)
POTASSIUM SERPL-SCNC: 3.9 MMOL/L (ref 3.5–5.2)
PROT SERPL-MCNC: 7.7 G/DL (ref 6–8.5)
SODIUM SERPL-SCNC: 139 MMOL/L (ref 134–144)
TRIGL SERPL-MCNC: 115 MG/DL (ref 0–149)
VLDLC SERPL CALC-MCNC: 21 MG/DL (ref 5–40)

## 2021-06-14 NOTE — PROGRESS NOTES
Crystal,    Please call patient and inform that labs do show some kidney disease, likely due to uncontrolled HTN. Needs to continue to take meds. We should recheck kidney function in 2 weeks, so we can see how he is doing with the new medications.   Will likely need referral to see a kidney specialist, which we can discuss at his fu appointment on 6/17    Thanks,  Maynor Holland

## 2021-06-17 ENCOUNTER — ANCILLARY PROCEDURE (OUTPATIENT)
Dept: CARDIOLOGY CLINIC | Age: 41
End: 2021-06-17
Payer: COMMERCIAL

## 2021-06-17 VITALS
DIASTOLIC BLOOD PRESSURE: 160 MMHG | WEIGHT: 278 LBS | BODY MASS INDEX: 38.92 KG/M2 | SYSTOLIC BLOOD PRESSURE: 210 MMHG | HEIGHT: 71 IN

## 2021-06-17 DIAGNOSIS — I50.32 CHRONIC DIASTOLIC HEART FAILURE, NYHA CLASS 2 (HCC): ICD-10-CM

## 2021-06-17 PROCEDURE — 93306 TTE W/DOPPLER COMPLETE: CPT | Performed by: INTERNAL MEDICINE

## 2021-06-18 ENCOUNTER — TELEPHONE (OUTPATIENT)
Dept: CARDIOLOGY CLINIC | Age: 41
End: 2021-06-18

## 2021-06-18 LAB
ECHO AO ASC DIAM: 3.28 CM
ECHO AO ROOT DIAM: 3.3 CM
ECHO AV AREA PEAK VELOCITY: 2.8 CM2
ECHO AV AREA/BSA PEAK VELOCITY: 1.2 CM2/M2
ECHO AV PEAK GRADIENT: 2.66 MMHG
ECHO AV PEAK VELOCITY: 81.54 CM/S
ECHO LA AREA 4C: 39.89 CM2
ECHO LA MAJOR AXIS: 5.71 CM
ECHO LA MINOR AXIS: 2.35 CM
ECHO LA VOL 2C: 217.18 ML (ref 18–58)
ECHO LA VOL 4C: 166.38 ML (ref 18–58)
ECHO LA VOL BP: 195.6 ML (ref 18–58)
ECHO LA VOL/BSA BIPLANE: 80.49 ML/M2 (ref 16–28)
ECHO LA VOLUME INDEX A2C: 89.37 ML/M2 (ref 16–28)
ECHO LA VOLUME INDEX A4C: 68.47 ML/M2 (ref 16–28)
ECHO LV E' LATERAL VELOCITY: 7.33 CM/S
ECHO LV E' SEPTAL VELOCITY: 8.02 CM/S
ECHO LV EDV A2C: 95.28 ML
ECHO LV EDV A4C: 95.3 ML
ECHO LV EDV BP: 95.87 ML (ref 67–155)
ECHO LV EDV INDEX A4C: 39.2 ML/M2
ECHO LV EDV INDEX BP: 39.5 ML/M2
ECHO LV EDV NDEX A2C: 39.2 ML/M2
ECHO LV EJECTION FRACTION A2C: 36 PERCENT
ECHO LV EJECTION FRACTION A4C: 53 PERCENT
ECHO LV EJECTION FRACTION BIPLANE: 45.9 PERCENT (ref 55–100)
ECHO LV ESV A2C: 60.51 ML
ECHO LV ESV A4C: 44.86 ML
ECHO LV ESV BP: 51.91 ML (ref 22–58)
ECHO LV ESV INDEX A2C: 24.9 ML/M2
ECHO LV ESV INDEX A4C: 18.5 ML/M2
ECHO LV ESV INDEX BP: 21.4 ML/M2
ECHO LV INTERNAL DIMENSION DIASTOLIC: 4.66 CM (ref 4.2–5.9)
ECHO LV INTERNAL DIMENSION SYSTOLIC: 3.69 CM
ECHO LV IVSD: 1.4 CM (ref 0.6–1)
ECHO LV MASS 2D: 274.5 G (ref 88–224)
ECHO LV MASS INDEX 2D: 113 G/M2 (ref 49–115)
ECHO LV POSTERIOR WALL DIASTOLIC: 1.49 CM (ref 0.6–1)
ECHO LVOT DIAM: 1.99 CM
ECHO LVOT PEAK GRADIENT: 2.17 MMHG
ECHO LVOT PEAK VELOCITY: 73.57 CM/S
ECHO LVOT SV: 38.4 ML
ECHO LVOT VTI: 12.39 CM
ECHO MV A VELOCITY: 47.29 CM/S
ECHO MV E DECELERATION TIME (DT): 112.95 MS
ECHO MV E VELOCITY: 90.81 CM/S
ECHO MV E/A RATIO: 1.92
ECHO MV E/E' LATERAL: 12.39
ECHO MV E/E' RATIO (AVERAGED): 11.86
ECHO MV E/E' SEPTAL: 11.32
ECHO RA AREA 4C: 22.14 CM2
ECHO RV TAPSE: 2.77 CM (ref 1.5–2)
LA VOL DISK BP: 191.4 ML (ref 18–58)
LVOT MG: 1.15 MMHG

## 2021-06-18 NOTE — TELEPHONE ENCOUNTER
Spoke with patient. Verified with two patient identifiers. Advised per Annie Silva NP, echocardiogram is normal, ejection fraction is 50-55%. No concern for heart failure at this time. Mild leakiness of mitral valve, probably from increased weight and uncontrolled HTN. Needs to keep fu with Dr. Kody Fuller to check on BP. Pt states that Efrain Hankins had just called him in regards to his labs and someone will call him to schedule a f/u appt within the next week or two with Soraya or Kody Fuller I advised where the message had been sent to the  and we make sure she calls to schedule a f/u. Patient verbalized understanding.

## 2021-06-18 NOTE — TELEPHONE ENCOUNTER
----- Message from Mary Jane Ziegler NP sent at 6/18/2021  4:00 PM EDT -----  Please call the patient and inform that echocardiogram is normal, ejection fraction is 50-55%. No concern for heart failure at this time. Mild leakiness of mitral valve, probably from increased weight and uncontrolled HTN.   Needs to keep fu with Dr. April Mullins to check on BP    Thanks,  Liza Bermudez

## 2021-06-18 NOTE — PROGRESS NOTES
Please call the patient and inform that echocardiogram is normal, ejection fraction is 50-55%. No concern for heart failure at this time. Mild leakiness of mitral valve, probably from increased weight and uncontrolled HTN.   Needs to keep fu with Dr. Juancho Perez to check on BP    Thanks,  Rubi Ackerman

## 2021-06-18 NOTE — TELEPHONE ENCOUNTER
This writer contacted patient in reference to lab results two patient identifiers confirmed. Patient informed of the following results per RUSH James:   Please call patient and inform that labs do show some kidney disease, likely due to uncontrolled HTN. Needs to continue to take meds. We should recheck kidney function in 2 weeks, so we can see how he is doing with the new medications. Will likely need referral to see a kidney specialist, which we can discuss at his f/u appointment. Patient verbalized understanding. Patient states he had echo completed on yesterday, but doesn't have a scheduled f/u appointment. Patient was advised he will be contacted by scheduling to set up follow up appointment.

## 2021-06-28 ENCOUNTER — OFFICE VISIT (OUTPATIENT)
Dept: CARDIOLOGY CLINIC | Age: 41
End: 2021-06-28
Payer: COMMERCIAL

## 2021-06-28 VITALS
BODY MASS INDEX: 39.9 KG/M2 | SYSTOLIC BLOOD PRESSURE: 160 MMHG | OXYGEN SATURATION: 100 % | RESPIRATION RATE: 18 BRPM | HEART RATE: 77 BPM | WEIGHT: 285 LBS | DIASTOLIC BLOOD PRESSURE: 98 MMHG | HEIGHT: 71 IN

## 2021-06-28 DIAGNOSIS — R06.83 SNORING: ICD-10-CM

## 2021-06-28 DIAGNOSIS — I10 ESSENTIAL HYPERTENSION: Primary | ICD-10-CM

## 2021-06-28 DIAGNOSIS — I50.32 CHRONIC DIASTOLIC HEART FAILURE, NYHA CLASS 2 (HCC): ICD-10-CM

## 2021-06-28 DIAGNOSIS — N18.31 STAGE 3A CHRONIC KIDNEY DISEASE (HCC): ICD-10-CM

## 2021-06-28 PROBLEM — I21.4 NSTEMI (NON-ST ELEVATED MYOCARDIAL INFARCTION) (HCC): Status: RESOLVED | Noted: 2020-03-31 | Resolved: 2021-06-28

## 2021-06-28 PROCEDURE — 99214 OFFICE O/P EST MOD 30 MIN: CPT | Performed by: NURSE PRACTITIONER

## 2021-06-28 PROCEDURE — 93000 ELECTROCARDIOGRAM COMPLETE: CPT | Performed by: NURSE PRACTITIONER

## 2021-06-28 RX ORDER — HYDRALAZINE HYDROCHLORIDE 100 MG/1
100 TABLET, FILM COATED ORAL 2 TIMES DAILY
Qty: 180 TABLET | Refills: 3 | Status: SHIPPED | OUTPATIENT
Start: 2021-06-28

## 2021-06-28 RX ORDER — METOPROLOL TARTRATE 100 MG/1
100 TABLET ORAL 2 TIMES DAILY
Qty: 180 TABLET | Refills: 3 | Status: SHIPPED | OUTPATIENT
Start: 2021-06-28

## 2021-06-28 NOTE — PROGRESS NOTES
Chief Complaint   Patient presents with    Blood Pressure Check     F/U Echocardiogram and Recent Labs; Denies Cardiac Symptoms    BP Check performed, outcome listed below   Vitals:    06/28/21 1445 06/28/21 1456   BP: (!) 162/100 (!) 160/98   BP 1 Location: Left arm Right arm   BP Patient Position: Sitting Sitting   BP Cuff Size: Large adult Adult   Pulse: 77    Resp: 18    Height: 5' 11\" (1.803 m)    Weight: 285 lb (129.3 kg)    SpO2: 100%        1. Have you been to the ER, urgent care clinic since your last visit? Hospitalized since your last visit? No    2. Have you seen or consulted any other health care providers outside of the [address] [place] [address] since your last visit? Include any pap smears or colon screening.  No

## 2021-06-28 NOTE — PROGRESS NOTES
Bret Barba, [facility]    Subjective/HPI:     Dixie Torres is a 38 y.o. male is here for routine f/u. He has a PMHx of HTN, HFpEF, morbid obesity and hx of tobacco abuse. Last visit, BP meds were resumed as pt had run out. BP was 210/160 in the office off meds. He also had echo done given HTN. Doing well since starting medications. Denies adverse side effects. Wife complains of excessive snoring, periods of apnea overnight. He does endorse fatigue and daytime somnolence. Did finally quit smoking, cold turkey. Misses it a little, but is committed not to resume. Current Outpatient Medications on File Prior to Visit   Medication Sig Dispense Refill    losartan (COZAAR) 100 mg tablet Take 1 Tablet by mouth daily. 90 Tablet 1    hydroCHLOROthiazide (HYDRODIURIL) 25 mg tablet Take 1 Tablet by mouth daily. 90 Tablet 3    atorvastatin (LIPITOR) 40 mg tablet Take 1 Tablet by mouth daily. 90 Tablet 3    amLODIPine (NORVASC) 10 mg tablet Take 1 Tablet by mouth daily. 90 Tablet 3    [DISCONTINUED] metoprolol tartrate (LOPRESSOR) 100 mg IR tablet Take 0.5 Tablets by mouth two (2) times a day. 60 Tablet 0    [DISCONTINUED] varenicline (CHANTIX STARTER MAISHA) 0.5 mg (11)- 1 mg (42) DsPk Per pack instructions (Patient not taking: Reported on 6/8/2021) 1 Dose Pack 0    [DISCONTINUED] aspirin delayed-release 81 mg tablet TAKE 1 TABLET BY MOUTH DAILY (Patient not taking: Reported on 6/8/2021) 30 Tab 0    [DISCONTINUED] pantoprazole (PROTONIX) 40 mg tablet TAKE 1 TABLET BY MOUTH BEFORE BREAKFAST AND DINNER (Patient not taking: Reported on 6/8/2021) 60 Tab 0     No current facility-administered medications on file prior to visit. Review of Symptoms:    Review of Systems   Constitutional: Negative for chills, fever and weight loss. HENT: Negative for nosebleeds. Eyes: Negative for blurred vision and double vision.    Respiratory: Negative for cough, shortness of breath and wheezing. Cardiovascular: Negative for chest pain, palpitations, orthopnea, leg swelling and PND. Skin: Negative for rash. Neurological: Negative for dizziness and loss of consciousness. Physical Exam:      General: Well developed, in no acute distress, cooperative and alert  Heart:  reg rate and rhythm; normal S1/S2; no murmurs, no gallops or rubs. Respiratory: Clear bilaterally x 4, no wheezing or rales  Extremities:  Normal cap refill, no cyanosis, atraumatic. No edema. Vascular: 2+ pulses symmetric in all extremities    Vitals:    06/28/21 1445 06/28/21 1456   BP: (!) 162/100 (!) 160/98   BP 1 Location: Left arm Right arm   BP Patient Position: Sitting Sitting   BP Cuff Size: Large adult Adult   Pulse: 77    Resp: 18    Height: 5' 11\" (1.803 m)    Weight: 285 lb (129.3 kg)    SpO2: 100%        ECG done today shows sinus rhythm     Assessment:       ICD-10-CM ICD-9-CM    1. Essential hypertension  I10 401.9 AMB POC EKG ROUTINE W/ 12 LEADS, INTER & REP      REFERRAL TO NEPHROLOGY      SLEEP MEDICINE REFERRAL   2. Chronic diastolic heart failure, NYHA class 2 ([facility])  I50.32 428.32 AMB POC EKG ROUTINE W/ 12 LEADS, INTER & REP      SLEEP MEDICINE REFERRAL   3. Stage 3a chronic kidney disease (HCC)  N18.31 585.3 AMB POC EKG ROUTINE W/ 12 LEADS, INTER & REP      REFERRAL TO NEPHROLOGY   4. Snoring  R06.83 786.09 SLEEP MEDICINE REFERRAL        Plan:     1. Essential hypertension  BP improved since resuming medications  BMP done 6/2021 with decreased GFR, 45, serum Cr 1.84  Continue losartan 100 mg daily, HCTZ 25 mg daily, amlodipine 10, lopressor 100 mg BID  Add hydralazine 100 mg BID    2. Chronic diastolic heart failure, NYHA class 2 ([facility]  Echo done 6/2021 with preserved LVEF 50-55%, grade 1 DD, mild concentric LVH with biatrial dilation, mild MR  Continue BP control, discussed compliance with medications  Cardiac cath done 3/2020 with normal coronaries angiographically    3.   CKD  Stage 3A -- likely related untreated HTN  Refer to nephrology    4.   Snoring  Multiple risk factors for ARMANDO; STOP-BANG 7  Will refer to sleep medicine for evaluation    F/u with Dr. Nelida Bradshaw in 1 month    Rosemarie Jenkins NP

## 2021-08-23 ENCOUNTER — TRANSCRIBE ORDER (OUTPATIENT)
Dept: SCHEDULING | Age: 41
End: 2021-08-23

## 2021-08-23 DIAGNOSIS — N17.9 ACUTE KIDNEY FAILURE, UNSPECIFIED (HCC): ICD-10-CM

## 2021-08-23 DIAGNOSIS — I10 ESSENTIAL (PRIMARY) HYPERTENSION: Primary | ICD-10-CM

## 2021-08-23 DIAGNOSIS — N18.31 CHRONIC KIDNEY DISEASE, STAGE 3A (HCC): ICD-10-CM

## 2021-12-29 ENCOUNTER — TELEPHONE (OUTPATIENT)
Dept: CARDIOLOGY CLINIC | Age: 41
End: 2021-12-29

## 2021-12-29 NOTE — TELEPHONE ENCOUNTER
Cardiac clearence, last office visit and EKG faxed to  [address] [place] [address] surgery, office of Dr. Benoit Herrera.

## 2021-12-29 NOTE — TELEPHONE ENCOUNTER
Per Trinidad Brumfield,  He has not been seen since 6/2021 and was supposed to fu with us in 1 month at that time.  His blood pressure does not appear controlled, so I'm not sure if they will do surgery, but from cardiac standpoint he is considered low operative risk.

## 2021-12-29 NOTE — TELEPHONE ENCOUNTER
Pre-Procedure Cardiac Clearance Request:    Facility:[facility] surgery    Procedure Date: unknown    Procedure:laparoscopic incarcerated umbilical hernia repair with mesh. Surgeon: Dr. Camila Hurtado      Is patient cleared from a cardiac standpoint to proceed with upcoming procedure. Please advise.

## 2022-02-10 ENCOUNTER — OFFICE VISIT (OUTPATIENT)
Dept: CARDIOLOGY CLINIC | Age: 42
End: 2022-02-10

## 2022-02-10 VITALS
OXYGEN SATURATION: 99 % | HEART RATE: 89 BPM | BODY MASS INDEX: 40.08 KG/M2 | DIASTOLIC BLOOD PRESSURE: 80 MMHG | RESPIRATION RATE: 18 BRPM | SYSTOLIC BLOOD PRESSURE: 140 MMHG | WEIGHT: 286.31 LBS | HEIGHT: 71 IN

## 2022-02-10 DIAGNOSIS — E78.3 MIXED HYPERGLYCERIDEMIA: ICD-10-CM

## 2022-02-10 DIAGNOSIS — I50.32 CHRONIC DIASTOLIC HEART FAILURE, NYHA CLASS 2 (HCC): ICD-10-CM

## 2022-02-10 DIAGNOSIS — I48.0 PAROXYSMAL ATRIAL FIBRILLATION (HCC): Primary | ICD-10-CM

## 2022-02-10 DIAGNOSIS — R07.9 CHEST PAIN AT REST: ICD-10-CM

## 2022-02-10 PROCEDURE — 93000 ELECTROCARDIOGRAM COMPLETE: CPT | Performed by: INTERNAL MEDICINE

## 2022-02-10 PROCEDURE — 99214 OFFICE O/P EST MOD 30 MIN: CPT | Performed by: INTERNAL MEDICINE

## 2022-02-10 RX ORDER — DILTIAZEM HYDROCHLORIDE 240 MG/1
240 CAPSULE, COATED, EXTENDED RELEASE ORAL DAILY
Qty: 30 CAPSULE | Refills: 3 | Status: SHIPPED | OUTPATIENT
Start: 2022-02-10

## 2022-02-10 NOTE — PROGRESS NOTES
Chief Complaint   Patient presents with    Chest Pain     Patient presents today with chest pain. Started on 02/05/2022.  Shortness of Breath     Patient feels like his heart is racing for no reason. On saturday, he was sleeping and he woke up with SOB. 1. Have you been to the ER, urgent care clinic since your last visit? Hospitalized since your last visit? Yes, 1 month ago. [facility] ED.     2. Have you seen or consulted any other health care providers outside of the [address] [place] [address] since your last visit? Yes, see above.

## 2022-02-10 NOTE — LETTER
2/10/2022    Patient: Abbie Harrington   YOB: 1980   Date of Visit: 2/10/2022     Ambika Alonzo, [address]  [facility] [id number]  [address] [facility] [address]    Dear Ambika Alonzo, NP,      Thank you for referring Mr. Rl Goodson to [facility] for evaluation. My notes for this consultation are attached. If you have questions, please do not hesitate to call me. I look forward to following your patient along with you.       Sincerely,    Elaine Amado MD

## 2022-02-10 NOTE — PROGRESS NOTES
Subjective/HPI:     Georgi Lopez is a 42 y.o. male is here for routine f/u. He has a PMHx of NSTEMI with normal coronaries in 2020, HTN, CKD. C/o palpitations, SOB, mild chest pain for last 5 days. No edema, PND, orthopnea. PCP Provider  Julia Chapin NP    Past Medical History:   Diagnosis Date    Acute renal failure ([facility] 3/31/2020    CAD (coronary artery disease)     Chronic diastolic heart failure, NYHA class 2 ([facility] 4/1/2020    Elevated BP     Essential hypertension     Family history of early CAD 3/31/2020    Myocardial infarction [facility])     Severe left ventricular hypertrophy 4/1/2020    Tobacco abuse         No Known Allergies     Outpatient Encounter Medications as of 2/10/2022   Medication Sig Dispense Refill    dilTIAZem ER (CARDIZEM CD) 240 mg capsule Take 1 Capsule by mouth daily. 30 Capsule 3    atorvastatin (LIPITOR) 40 mg tablet Take 1 Tablet by mouth daily. 90 Tablet 3    hydrALAZINE (APRESOLINE) 100 mg tablet Take 1 Tablet by mouth two (2) times a day. 180 Tablet 3    metoprolol tartrate (LOPRESSOR) 100 mg IR tablet Take 1 Tablet by mouth two (2) times a day. 180 Tablet 3    losartan (COZAAR) 100 mg tablet Take 1 Tablet by mouth daily. 90 Tablet 1    hydroCHLOROthiazide (HYDRODIURIL) 25 mg tablet Take 1 Tablet by mouth daily. 90 Tablet 3    [DISCONTINUED] amLODIPine (NORVASC) 10 mg tablet Take 1 Tablet by mouth daily. 90 Tablet 3     No facility-administered encounter medications on file as of 2/10/2022. Review of Symptoms:    ROS    Physical Exam:      General: Well developed, in no acute distress, cooperative and alert  HEENT: No carotid bruits, no JVD, trach is midline. Neck Supple, PEERL, EOM intact. Heart:  reg rate and rhythm; normal S1/S2; no murmurs, no gallops or rubs. Respiratory: Clear bilaterally x 4, no wheezing or rales  Abdomen:   Soft, non-tender, no distention, no masses. + BS.    Extremities:  Normal cap refill, no no cyanosis, atraumatic. No edema. Neuro: A&Ox3, speech clear, gait stable. Skin: Skin color is normal. No rashes or lesions. Non diaphoretic  Vascular: 2+ pulses symmetric in all extremities    Visit Vitals  BP (!) 140/80 (BP 1 Location: Left arm, BP Patient Position: Supine, BP Cuff Size: Large adult)   Pulse 89   Resp 18   Ht 5' 11\" (1.803 m)   Wt 286 lb 5 oz (129.9 kg)   SpO2 99%   BMI 39.93 kg/m²       ECG: afib with RVR    Cardiology Labs:    Lab Results   Component Value Date/Time    Cholesterol, total 154 06/11/2021 12:00 AM    HDL Cholesterol 50 06/11/2021 12:00 AM    LDL, calculated 83 06/11/2021 12:00 AM    LDL, calculated 71.4 03/31/2020 03:20 AM    VLDL, calculated 21 06/11/2021 12:00 AM    VLDL, calculated 17.6 03/31/2020 03:20 AM    CHOL/HDL Ratio 3.0 03/31/2020 03:20 AM       Lab Results   Component Value Date/Time    Hemoglobin A1c 5.7 (H) 03/31/2020 03:20 AM       Lab Results   Component Value Date/Time    Sodium 139 06/11/2021 12:00 AM    Potassium 3.9 06/11/2021 12:00 AM    Chloride 101 06/11/2021 12:00 AM    CO2 22 06/11/2021 12:00 AM    Glucose 95 06/11/2021 12:00 AM    BUN 33 (H) 06/11/2021 12:00 AM    Creatinine 1.84 (H) 06/11/2021 12:00 AM    BUN/Creatinine ratio 18 06/11/2021 12:00 AM    GFR est AA 51 (L) 06/11/2021 12:00 AM    GFR est non-AA 45 (L) 06/11/2021 12:00 AM    Calcium 9.5 06/11/2021 12:00 AM    Anion gap 5 04/02/2020 04:39 AM    Bilirubin, total 0.3 06/11/2021 12:00 AM    ALT (SGPT) 20 06/11/2021 12:00 AM    Alk. phosphatase 58 06/11/2021 12:00 AM    Protein, total 7.7 06/11/2021 12:00 AM    Albumin 4.6 06/11/2021 12:00 AM    Globulin 4.0 03/31/2020 12:02 AM    A-G Ratio 1.5 06/11/2021 12:00 AM          Assessment:       ICD-10-CM ICD-9-CM    1. Paroxysmal atrial fibrillation (HCC)  W78.4 002.46 METABOLIC PANEL, BASIC      MAGNESIUM      METABOLIC PANEL, BASIC      MAGNESIUM   2. Chest pain at rest  R07.9 786.50 AMB POC EKG ROUTINE W/ 12 LEADS, INTER & REP   3.  Chronic diastolic heart failure, NYHA class 2 (HCC)  I50.32 428.32    4. Mixed hyperglyceridemia  E78.3 272.3         Plan:     1. Chest pain at rest  Probably secondary to afib. Previous cath normal 2 years ago. - AMB POC EKG ROUTINE W/ 12 LEADS, INTER & REP    2. Paroxysmal atrial fibrillation (HCC)  Poorly controlled rate, recent onset. Will add cardizem for better rate control, stop amlodipine. CHADS 1. Will defer anticoagulation for now. Discussed ADOLFO/DCC. Previously noted to have dilated LA. May not be a good candidate for PVI. Will discuss with EP. Recommend sleep apnea testing once rhythm corrected. - METABOLIC PANEL, BASIC; Future  - MAGNESIUM; Future  - METABOLIC PANEL, BASIC  - MAGNESIUM    3. Chronic diastolic heart failure, NYHA class 2 (HCC)  Compensated . 4. Mixed hyperglyceridemia  Continue statin.       Rosalinda Alvarez MD

## 2022-02-14 ENCOUNTER — OFFICE VISIT (OUTPATIENT)
Dept: URGENT CARE | Age: 42
End: 2022-02-14
Payer: COMMERCIAL

## 2022-02-14 VITALS — HEART RATE: 59 BPM | OXYGEN SATURATION: 100 % | TEMPERATURE: 98.3 F

## 2022-02-14 DIAGNOSIS — Z20.822 ENCOUNTER FOR LABORATORY TESTING FOR COVID-19 VIRUS: Primary | ICD-10-CM

## 2022-02-14 LAB — SARS-COV-2 PCR, POC: NEGATIVE

## 2022-02-14 PROCEDURE — 87635 SARS-COV-2 COVID-19 AMP PRB: CPT | Performed by: FAMILY MEDICINE

## 2022-02-14 PROCEDURE — 99211 OFF/OP EST MAY X REQ PHY/QHP: CPT | Performed by: FAMILY MEDICINE

## 2022-02-15 LAB
BUN SERPL-MCNC: 26 MG/DL (ref 6–24)
BUN/CREAT SERPL: 15 (ref 9–20)
CALCIUM SERPL-MCNC: 9.8 MG/DL (ref 8.7–10.2)
CHLORIDE SERPL-SCNC: 104 MMOL/L (ref 96–106)
CO2 SERPL-SCNC: 24 MMOL/L (ref 20–29)
CREAT SERPL-MCNC: 1.76 MG/DL (ref 0.76–1.27)
GLUCOSE SERPL-MCNC: 112 MG/DL (ref 65–99)
INTERPRETATION: NORMAL
MAGNESIUM SERPL-MCNC: 2 MG/DL (ref 1.6–2.3)
POTASSIUM SERPL-SCNC: 4.1 MMOL/L (ref 3.5–5.2)
SODIUM SERPL-SCNC: 142 MMOL/L (ref 134–144)

## 2022-02-17 ENCOUNTER — TELEPHONE (OUTPATIENT)
Dept: CARDIOLOGY CLINIC | Age: 42
End: 2022-02-17

## 2022-02-17 NOTE — TELEPHONE ENCOUNTER
----- Message from Connor Barrow NP sent at 2/17/2022 10:14 AM EST -----  Please call patient and inform that labs are stable. Kidney function is slightly reduced, but at baseline. Normal electrolytes and magnesium. Keep fu with Dr. Alfreda Amaral as scheduled.

## 2022-02-17 NOTE — PROGRESS NOTES
Please call patient and inform that labs are stable. Kidney function is slightly reduced, but at baseline. Normal electrolytes and magnesium. Keep fu with Dr. Yoshi Root as scheduled.

## 2022-02-17 NOTE — TELEPHONE ENCOUNTER
Verified Patient with two identifiers  Spoke with patient regarding results and recommendations. Patient voiced understanding.

## 2022-02-18 ENCOUNTER — HOSPITAL ENCOUNTER (OUTPATIENT)
Dept: PREADMISSION TESTING | Age: 42
Discharge: HOME | End: 2022-02-18
Payer: COMMERCIAL

## 2022-02-18 PROCEDURE — U0005 INFEC AGEN DETEC AMPLI PROBE: HCPCS

## 2022-02-19 LAB
SARS-COV-2, XPLCVT: NOT DETECTED
SOURCE, COVRS: NORMAL

## 2022-02-22 ENCOUNTER — HOSPITAL ENCOUNTER (OUTPATIENT)
Dept: NON INVASIVE DIAGNOSTICS | Age: 42
Discharge: HOME | End: 2022-02-22
Payer: COMMERCIAL

## 2022-02-22 VITALS — BODY MASS INDEX: 38.77 KG/M2 | WEIGHT: 278 LBS

## 2022-02-22 DIAGNOSIS — I48.91 ATRIAL FIBRILLATION, UNSPECIFIED TYPE (HCC): ICD-10-CM

## 2022-02-22 LAB
ATRIAL RATE: 66 BPM
CALCULATED P AXIS, ECG09: 62 DEGREES
CALCULATED R AXIS, ECG10: -31 DEGREES
CALCULATED T AXIS, ECG11: 96 DEGREES
DIAGNOSIS, 93000: NORMAL
P-R INTERVAL, ECG05: 202 MS
Q-T INTERVAL, ECG07: 430 MS
QRS DURATION, ECG06: 110 MS
QTC CALCULATION (BEZET), ECG08: 450 MS
VENTRICULAR RATE, ECG03: 66 BPM

## 2022-02-22 PROCEDURE — 93005 ELECTROCARDIOGRAM TRACING: CPT

## 2022-02-22 RX ORDER — LIDOCAINE HYDROCHLORIDE 20 MG/ML
15 SOLUTION OROPHARYNGEAL AS NEEDED
Status: DISCONTINUED | OUTPATIENT
Start: 2022-02-22 | End: 2022-02-26 | Stop reason: HOSPADM

## 2022-02-22 RX ORDER — FENTANYL CITRATE 50 UG/ML
12.5-5 INJECTION, SOLUTION INTRAMUSCULAR; INTRAVENOUS
Status: DISCONTINUED | OUTPATIENT
Start: 2022-02-22 | End: 2022-02-26 | Stop reason: HOSPADM

## 2022-02-22 RX ORDER — MIDAZOLAM HYDROCHLORIDE 1 MG/ML
.5-2 INJECTION, SOLUTION INTRAMUSCULAR; INTRAVENOUS
Status: DISCONTINUED | OUTPATIENT
Start: 2022-02-22 | End: 2022-02-26 | Stop reason: HOSPADM

## 2022-02-22 NOTE — PROGRESS NOTES
Pt arrived for ADOLFO/DCC in NSR. Confirmed by EKG w/ Dr. Frank Cha. No med changes and pt will need to call office to schedule a 2 week follow up. Pt and wife verbalized understanding.

## 2022-03-18 PROBLEM — Z82.49 FAMILY HISTORY OF EARLY CAD: Status: ACTIVE | Noted: 2020-03-31

## 2022-03-19 PROBLEM — I51.7 SEVERE LEFT VENTRICULAR HYPERTROPHY: Status: ACTIVE | Noted: 2020-04-01

## 2022-03-19 PROBLEM — E66.01 SEVERE OBESITY (HCC): Status: ACTIVE | Noted: 2020-02-14

## 2022-03-19 PROBLEM — E66.812 CLASS 2 OBESITY IN ADULT: Status: ACTIVE | Noted: 2020-03-31

## 2022-03-19 PROBLEM — N17.9 ACUTE RENAL FAILURE (HCC): Status: ACTIVE | Noted: 2020-03-31

## 2022-03-19 PROBLEM — E66.9 CLASS 2 OBESITY IN ADULT: Status: ACTIVE | Noted: 2020-03-31

## 2022-03-19 PROBLEM — I50.32 CHRONIC DIASTOLIC HEART FAILURE, NYHA CLASS 2 (HCC): Status: ACTIVE | Noted: 2020-04-01

## 2022-12-23 ENCOUNTER — OFFICE VISIT (OUTPATIENT)
Dept: FAMILY MEDICINE CLINIC | Age: 42
End: 2022-12-23
Payer: COMMERCIAL

## 2022-12-23 VITALS
TEMPERATURE: 97.5 F | HEIGHT: 71 IN | OXYGEN SATURATION: 97 % | SYSTOLIC BLOOD PRESSURE: 128 MMHG | WEIGHT: 294 LBS | BODY MASS INDEX: 41.16 KG/M2 | HEART RATE: 71 BPM | DIASTOLIC BLOOD PRESSURE: 83 MMHG | RESPIRATION RATE: 18 BRPM

## 2022-12-23 DIAGNOSIS — I48.91 ATRIAL FIBRILLATION, UNSPECIFIED TYPE (HCC): ICD-10-CM

## 2022-12-23 DIAGNOSIS — I10 PRIMARY HYPERTENSION: ICD-10-CM

## 2022-12-23 DIAGNOSIS — R58 BLOOD IN TOILET BOWL: Primary | ICD-10-CM

## 2022-12-23 DIAGNOSIS — F17.218 CIGARETTE NICOTINE DEPENDENCE WITH OTHER NICOTINE-INDUCED DISORDER: ICD-10-CM

## 2022-12-23 DIAGNOSIS — E66.01 OBESITY, CLASS III, BMI 40-49.9 (MORBID OBESITY) (HCC): ICD-10-CM

## 2022-12-23 DIAGNOSIS — N18.31 STAGE 3A CHRONIC KIDNEY DISEASE (HCC): ICD-10-CM

## 2022-12-23 PROBLEM — N18.30 CHRONIC RENAL DISEASE, STAGE III (HCC): Status: ACTIVE | Noted: 2022-12-23

## 2022-12-23 RX ORDER — VARENICLINE TARTRATE 0.5 MG/1
TABLET, FILM COATED ORAL
Qty: 94 TABLET | Refills: 0 | Status: SHIPPED | OUTPATIENT
Start: 2022-12-23

## 2022-12-23 RX ORDER — VARENICLINE TARTRATE 1 MG/1
1 TABLET, FILM COATED ORAL 2 TIMES DAILY
Qty: 60 TABLET | Refills: 5 | Status: SHIPPED | OUTPATIENT
Start: 2023-01-23

## 2022-12-23 RX ORDER — APIXABAN 5 MG/1
5 TABLET, FILM COATED ORAL 2 TIMES DAILY
COMMUNITY
Start: 2022-12-01

## 2022-12-23 NOTE — PROGRESS NOTES
Health Maintenance Due   Topic Date Due    Hepatitis C Screening  Never done    COVID-19 Vaccine (1) Never done    Pneumococcal 0-64 years (1 - PCV) Never done    DTaP/Tdap/Td series (1 - Tdap) Never done    Lipid Screen  06/11/2022    Flu Vaccine (1) 08/01/2022     1. \"Have you been to the ER, urgent care clinic since your last visit? Hospitalized since your last visit? \"  Yes    2. \"Have you seen or consulted any other health care providers outside of the [address] [place] [address] since your last visit? \" No     3. For patients aged 39-70: Has the patient had a colonoscopy / FIT/ Cologuard? NA - based on age      If the patient is female:    4. For patients aged 41-77: Has the patient had a mammogram within the past 2 years? NA - based on age or sex      11. For patients aged 21-65: Has the patient had a pap smear?  NA - based on age or sex

## 2022-12-23 NOTE — PROGRESS NOTES
JOSE Mccormick is a 36 y.o. male who presents with a concern of blood in the toilet bowl after pooping. Has had 3 episodes over the last 2 weeks with several days between. It is enough to turn the toilet bowl red. No obvious clots. He is taking Eliquis which is a relatively recent addition. He has noticed that his stool is harder and he is having to strain a little bit more recently. There is no obvious hemorrhoidal discomfort. Denies lightheaded dizziness or shortness of breath    PMHx:  Past Medical History:   Diagnosis Date    Acute renal failure ([facility] 3/31/2020    CAD (coronary artery disease)     Chronic diastolic heart failure, NYHA class 2 ([facility] 4/1/2020    Elevated BP     Essential hypertension     Family history of early CAD 3/31/2020    Myocardial infarction [facility])     Severe left ventricular hypertrophy 4/1/2020    Tobacco abuse        Meds:   Current Outpatient Medications   Medication Sig Dispense Refill    Eliquis 5 mg tablet Take 5 mg by mouth two (2) times a day. losartan (COZAAR) 100 mg tablet TAKE 1 TABLET BY MOUTH EVERY DAY 30 Tablet 1    dilTIAZem ER (CARDIZEM CD) 240 mg capsule Take 1 Capsule by mouth daily. 30 Capsule 3    hydrALAZINE (APRESOLINE) 100 mg tablet Take 1 Tablet by mouth two (2) times a day. 180 Tablet 3    metoprolol tartrate (LOPRESSOR) 100 mg IR tablet Take 1 Tablet by mouth two (2) times a day. 180 Tablet 3    hydroCHLOROthiazide (HYDRODIURIL) 25 mg tablet Take 1 Tablet by mouth daily. 90 Tablet 3    atorvastatin (LIPITOR) 40 mg tablet Take 1 Tablet by mouth daily.  90 Tablet 3       Allergies:   No Known Allergies    Smoker:  Social History     Tobacco Use   Smoking Status Every Day    Packs/day: 0.50    Types: Cigarettes    Start date: 4/3/2000   Smokeless Tobacco Never       ETOH:   Social History     Substance and Sexual Activity   Alcohol Use Yes    Alcohol/week: 4.0 standard drinks    Types: 4 Shots of liquor per week    Comment: socially on weekend FH:   Family History   Problem Relation Age of Onset    Hypertension Mother     Diabetes Father     Hypertension Sister     Hypertension Brother     Cancer Paternal Uncle     Cancer Paternal Grandfather        ROS:   As listed in HPI. In addition:  Constitutional:   No headache, fever, fatigue, weight loss or weight gain      Cardiac:    No chest pain      Resp:   No cough or shortness of breath      Neuro   No loss of consciousness, dizziness, seizures      Physical Exam:  Blood pressure 128/83, pulse 71, temperature 97.5 °F (36.4 °C), temperature source Temporal, resp. rate 18, height 5' 11\" (1.803 m), weight 294 lb (133.4 kg), SpO2 97 %. GEN: No apparent distress. Alert and oriented and responds to all questions appropriately. NEUROLOGIC:  No focal neurologic deficits. Strength and sensation grossly intact. Coordination and gait grossly intact. EXT: Well perfused. No edema. SKIN: No obvious rashes. Lungs clear to auscultation bilaterally  CV regular rate rhythm no murmur       Assessment and Plan     Blood in the toilet bowl  We discussed the sources of GI bleeding. Hemorrhoidal bleeding is the most likely source based on the history of straining and the intermittent blood. Should not discount the colon source so colonoscopy is indicated  Is not having any symptoms of anemia does not appear to have had a hemodynamically significant bleed. We will check a CBC and an abundance of caution. CKD 3  We will update his CMP which has not been done in a year. GFR in 2021 was 45. Hypertension  Well-controlled which she was little surprised but    A. fib  Care of cardiology  Eliquis. Reasonable to continue this for what appears to be a minor bleeding event    Nicotine addiction  Is interested in quitting smoking. He successfully quit in the past for about a months with the help of Chantix taken for 3 months. He thinks he may succeed with a longer course of Chantix.   Does not recall any side effects. Sent in the starter dose and a continuation prescription for 6 months      ICD-10-CM ICD-9-CM    1. Primary hypertension  I10 401.9 CBC WITH AUTOMATED DIFF      METABOLIC PANEL, COMPREHENSIVE      2. Stage 3a chronic kidney disease (HCC)  N18.31 585.3       3. Obesity, Class III, BMI 40-49.9 (morbid obesity) (HCC)  E66.01 278.01       4. Blood in toilet bowl  R58 459.0 REFERRAL TO GASTROENTEROLOGY      5. Atrial fibrillation, unspecified type ([facility]  I48.91 427.31           AVS given.  Pt expressed understanding of instructions

## 2022-12-24 LAB
ALBUMIN SERPL-MCNC: 4 G/DL (ref 3.5–5)
ALBUMIN/GLOB SERPL: 1.3 {RATIO} (ref 1.1–2.2)
ALP SERPL-CCNC: 47 U/L (ref 45–117)
ALT SERPL-CCNC: 31 U/L (ref 12–78)
ANION GAP SERPL CALC-SCNC: 6 MMOL/L (ref 5–15)
AST SERPL-CCNC: 14 U/L (ref 15–37)
BILIRUB SERPL-MCNC: 0.4 MG/DL (ref 0.2–1)
BUN SERPL-MCNC: 24 MG/DL (ref 6–20)
BUN/CREAT SERPL: 13 (ref 12–20)
CALCIUM SERPL-MCNC: 9.3 MG/DL (ref 8.5–10.1)
CHLORIDE SERPL-SCNC: 108 MMOL/L (ref 97–108)
CO2 SERPL-SCNC: 26 MMOL/L (ref 21–32)
CREAT SERPL-MCNC: 1.79 MG/DL (ref 0.7–1.3)
GLOBULIN SER CALC-MCNC: 3.1 G/DL (ref 2–4)
GLUCOSE SERPL-MCNC: 115 MG/DL (ref 65–100)
POTASSIUM SERPL-SCNC: 3.6 MMOL/L (ref 3.5–5.1)
PROT SERPL-MCNC: 7.1 G/DL (ref 6.4–8.2)
SODIUM SERPL-SCNC: 140 MMOL/L (ref 136–145)

## 2022-12-27 ENCOUNTER — APPOINTMENT (OUTPATIENT)
Dept: FAMILY MEDICINE CLINIC | Age: 42
End: 2022-12-27

## 2022-12-27 ENCOUNTER — TELEPHONE (OUTPATIENT)
Dept: FAMILY MEDICINE CLINIC | Age: 42
End: 2022-12-27

## 2022-12-27 DIAGNOSIS — R58 BLOOD IN TOILET BOWL: ICD-10-CM

## 2022-12-27 DIAGNOSIS — I10 PRIMARY HYPERTENSION: Primary | ICD-10-CM

## 2022-12-27 DIAGNOSIS — I10 PRIMARY HYPERTENSION: ICD-10-CM

## 2022-12-27 NOTE — TELEPHONE ENCOUNTER
Hugo Sheridan   Lawrence: [id number]   PA Case ID: [id number]   Rx #: [id number]  Status Sent to [facility]  Drug APO-Varenicline 0.5MG tablets  [facility] Jose PA Form

## 2022-12-27 NOTE — TELEPHONE ENCOUNTER
Abdias Porter   Lawrence: [id number]   PA Case ID: [id number]   Rx #: [id number]  Status Sent to Plan today  Drug APO-Varenicline 1MG tablets  [facility] Electronic PA Form

## 2022-12-27 NOTE — TELEPHONE ENCOUNTER
----- Message from [employer] sent at 2022  9:28 AM EST -----  Regarding: Lab Specimen Problem  Contact: [phone]  Hello,         Please see information below for details regarding a problem with samples received at [address] [place] [address] Laboratory:         Patient Name: Pepper Davis    Patient : 1980    Test(s) affected: CBC w/ diff    Description of problem: Specimen Clotted         Please place a new order and Client Services will contact the patient for recollection. If you have any questions, please call [phone] and a representative will assist you.          Thank you,         Ever[facility] [address] Laboratory Client Services

## 2022-12-28 LAB
BASOPHILS # BLD: 0.1 K/UL (ref 0–0.1)
BASOPHILS NFR BLD: 1 % (ref 0–1)
DIFFERENTIAL METHOD BLD: NORMAL
EOSINOPHIL # BLD: 0.2 K/UL (ref 0–0.4)
EOSINOPHIL NFR BLD: 2 % (ref 0–7)
ERYTHROCYTE [DISTWIDTH] IN BLOOD BY AUTOMATED COUNT: 13.9 % (ref 11.5–14.5)
HCT VFR BLD AUTO: 43 % (ref 36.6–50.3)
HGB BLD-MCNC: 14.4 G/DL (ref 12.1–17)
IMM GRANULOCYTES # BLD AUTO: 0 K/UL (ref 0–0.04)
IMM GRANULOCYTES NFR BLD AUTO: 0 % (ref 0–0.5)
LYMPHOCYTES # BLD: 2 K/UL (ref 0.8–3.5)
LYMPHOCYTES NFR BLD: 26 % (ref 12–49)
MCH RBC QN AUTO: 29.1 PG (ref 26–34)
MCHC RBC AUTO-ENTMCNC: 33.5 G/DL (ref 30–36.5)
MCV RBC AUTO: 87 FL (ref 80–99)
MONOCYTES # BLD: 1 K/UL (ref 0–1)
MONOCYTES NFR BLD: 13 % (ref 5–13)
NEUTS SEG # BLD: 4.4 K/UL (ref 1.8–8)
NEUTS SEG NFR BLD: 58 % (ref 32–75)
NRBC # BLD: 0 K/UL (ref 0–0.01)
NRBC BLD-RTO: 0 PER 100 WBC
PLATELET # BLD AUTO: NORMAL K/UL (ref 150–400)
RBC # BLD AUTO: 4.94 M/UL (ref 4.1–5.7)
RBC MORPH BLD: NORMAL
WBC # BLD AUTO: 7.7 K/UL (ref 4.1–11.1)

## 2022-12-29 ENCOUNTER — TELEPHONE (OUTPATIENT)
Dept: FAMILY MEDICINE CLINIC | Age: 42
End: 2022-12-29

## 2022-12-29 NOTE — TELEPHONE ENCOUNTER
Labs reviewed. Blood counts are normal.  This is reassuring that he has not lost a clinically significant amount of blood.   Good news

## 2023-01-03 ENCOUNTER — TELEPHONE (OUTPATIENT)
Dept: FAMILY MEDICINE CLINIC | Age: 43
End: 2023-01-03

## 2023-01-03 NOTE — TELEPHONE ENCOUNTER
Pt wife is calling to inform that the [facility] doctor has not received the referral for the pt    [phone] (phone)

## 2023-07-12 ENCOUNTER — OFFICE VISIT (OUTPATIENT)
Age: 43
End: 2023-07-12
Payer: COMMERCIAL

## 2023-07-12 VITALS
HEART RATE: 71 BPM | SYSTOLIC BLOOD PRESSURE: 136 MMHG | RESPIRATION RATE: 16 BRPM | HEIGHT: 71 IN | OXYGEN SATURATION: 96 % | WEIGHT: 308.6 LBS | BODY MASS INDEX: 43.2 KG/M2 | DIASTOLIC BLOOD PRESSURE: 89 MMHG

## 2023-07-12 DIAGNOSIS — I10 PRIMARY HYPERTENSION: ICD-10-CM

## 2023-07-12 DIAGNOSIS — R73.9 ELEVATED BLOOD SUGAR: ICD-10-CM

## 2023-07-12 DIAGNOSIS — N18.31 STAGE 3A CHRONIC KIDNEY DISEASE (HCC): ICD-10-CM

## 2023-07-12 DIAGNOSIS — M25.551 RIGHT HIP PAIN: ICD-10-CM

## 2023-07-12 DIAGNOSIS — K42.9 UMBILICAL HERNIA WITHOUT OBSTRUCTION AND WITHOUT GANGRENE: Primary | ICD-10-CM

## 2023-07-12 PROCEDURE — 3075F SYST BP GE 130 - 139MM HG: CPT | Performed by: FAMILY MEDICINE

## 2023-07-12 PROCEDURE — 99214 OFFICE O/P EST MOD 30 MIN: CPT | Performed by: FAMILY MEDICINE

## 2023-07-12 PROCEDURE — 3079F DIAST BP 80-89 MM HG: CPT | Performed by: FAMILY MEDICINE

## 2023-07-12 RX ORDER — SEMAGLUTIDE 0.68 MG/ML
0.25 INJECTION, SOLUTION SUBCUTANEOUS WEEKLY
Qty: 3 ML | Refills: 0 | Status: SHIPPED | OUTPATIENT
Start: 2023-07-12 | End: 2023-08-11

## 2023-07-12 SDOH — ECONOMIC STABILITY: HOUSING INSECURITY
IN THE LAST 12 MONTHS, WAS THERE A TIME WHEN YOU DID NOT HAVE A STEADY PLACE TO SLEEP OR SLEPT IN A SHELTER (INCLUDING NOW)?: NO

## 2023-07-12 SDOH — ECONOMIC STABILITY: FOOD INSECURITY: WITHIN THE PAST 12 MONTHS, THE FOOD YOU BOUGHT JUST DIDN'T LAST AND YOU DIDN'T HAVE MONEY TO GET MORE.: NEVER TRUE

## 2023-07-12 SDOH — ECONOMIC STABILITY: FOOD INSECURITY: WITHIN THE PAST 12 MONTHS, YOU WORRIED THAT YOUR FOOD WOULD RUN OUT BEFORE YOU GOT MONEY TO BUY MORE.: NEVER TRUE

## 2023-07-12 SDOH — ECONOMIC STABILITY: INCOME INSECURITY: HOW HARD IS IT FOR YOU TO PAY FOR THE VERY BASICS LIKE FOOD, HOUSING, MEDICAL CARE, AND HEATING?: NOT HARD AT ALL

## 2023-07-12 ASSESSMENT — PATIENT HEALTH QUESTIONNAIRE - PHQ9
SUM OF ALL RESPONSES TO PHQ QUESTIONS 1-9: 0
SUM OF ALL RESPONSES TO PHQ9 QUESTIONS 1 & 2: 0
2. FEELING DOWN, DEPRESSED OR HOPELESS: 0
SUM OF ALL RESPONSES TO PHQ QUESTIONS 1-9: 0
1. LITTLE INTEREST OR PLEASURE IN DOING THINGS: 0

## 2023-07-12 NOTE — PROGRESS NOTES
Chief Complaint   Patient presents with    Diabetes     Follow up      Pt reports that he has been having right hip pain. Present for several months, boterhing him more. Pt reports that he had an MRI several years ago, was told that it was abnormal    Pt was previously scheduled for hernia surgery, was then canceled due to heart issues. Since that time pt had a colonoscopy. Pt is a . Subjective: (As above and below)     Chief Complaint   Patient presents with    Diabetes     Follow up      he is a 36y.o. year old male who presents for evaluation. Reviewed PmHx, RxHx, FmHx, SocHx, AllgHx and updated in chart. Review of Systems - negative except as listed above    Objective:     Vitals:    07/12/23 1341 07/12/23 1347   BP: (!) 140/91 136/89   Site: Left Upper Arm    Position: Sitting    Cuff Size: Large Adult    Pulse: 71    Resp: 16    SpO2: 96%    Weight: (!) 308 lb 9.6 oz (140 kg)    Height: 5' 11\" (1.803 m)      Physical Examination: General appearance - alert, well appearing, and in no distress  Mental status - normal mood, behavior, speech, dress, motor activity, and thought processes  Ears - bilateral TM's and external ear canals normal  Chest - clear to auscultation, no wheezes, rales or rhonchi, symmetric air entry  Heart - normal rate, regular rhythm, normal S1, S2, no murmurs, rubs, clicks or gallops  Musculoskeletal - no joint tenderness, deformity or swelling  Extremities - peripheral pulses normal, no pedal edema, no clubbing or cyanosis    Assessment/ Plan:   1. Umbilical hernia without obstruction and without gangrene  -refer for evaluation  - Annalisa Kerr MD, General Surgery, [address]    2. Right hip pain  -check imaging  - XR HIP 2-3 VW W PELVIS RIGHT; Future    3. Elevated blood sugar  - Comprehensive Metabolic Panel; Future  - Hemoglobin A1C; Future  - Lipid Panel; Future  - Vitamin D 25 Hydroxy; Future    4.  Primary hypertension  - CBC with Auto

## 2023-07-12 NOTE — PROGRESS NOTES
Chief Complaint   Patient presents with    Diabetes     Follow up          Health Maintenance Due   Topic Date Due    COVID-19 Vaccine (1) Never done    Pneumococcal 0-64 years Vaccine (1 - PCV) Never done    HIV screen  Never done    Hepatitis C screen  Never done    DTaP/Tdap/Td vaccine (1 - Tdap) Never done    A1C test (Diabetic or Prediabetic)  03/31/2021    Lipids  06/11/2022           1. \"Have you been to the ER, urgent care clinic since your last visit? Hospitalized since your last visit? \" No    2. \"Have you seen or consulted any other health care providers outside of the [address] [place] [address] since your last visit? \" No     3. For patients aged 43-73: Has the patient had a colonoscopy / FIT/ Cologuard? NA - based on age      If the patient is female:    4. For patients aged 43-66: Has the patient had a mammogram within the past 2 years? NA - based on age or sex      11. For patients aged 21-65: Has the patient had a pap smear?  NA - based on age or sex

## 2023-07-13 ENCOUNTER — CLINICAL DOCUMENTATION (OUTPATIENT)
Age: 43
End: 2023-07-13

## 2023-07-13 LAB
25(OH)D3 SERPL-MCNC: 32.1 NG/ML (ref 30–100)
ALBUMIN SERPL-MCNC: 4 G/DL (ref 3.5–5)
ALBUMIN/GLOB SERPL: 1.3 (ref 1.1–2.2)
ALP SERPL-CCNC: 42 U/L (ref 45–117)
ALT SERPL-CCNC: 28 U/L (ref 12–78)
ANION GAP SERPL CALC-SCNC: 8 MMOL/L (ref 5–15)
AST SERPL-CCNC: 11 U/L (ref 15–37)
BILIRUB SERPL-MCNC: 0.2 MG/DL (ref 0.2–1)
BUN SERPL-MCNC: 28 MG/DL (ref 6–20)
BUN/CREAT SERPL: 17 (ref 12–20)
CALCIUM SERPL-MCNC: 9.7 MG/DL (ref 8.5–10.1)
CHLORIDE SERPL-SCNC: 108 MMOL/L (ref 97–108)
CHOLEST SERPL-MCNC: 123 MG/DL
CO2 SERPL-SCNC: 25 MMOL/L (ref 21–32)
COMMENT:: NORMAL
CREAT SERPL-MCNC: 1.62 MG/DL (ref 0.7–1.3)
GLOBULIN SER CALC-MCNC: 3.1 G/DL (ref 2–4)
GLUCOSE SERPL-MCNC: 101 MG/DL (ref 65–100)
HCV AB SERPL QL IA: NONREACTIVE
HDLC SERPL-MCNC: 41 MG/DL
HDLC SERPL: 3 (ref 0–5)
LDLC SERPL CALC-MCNC: 50.6 MG/DL (ref 0–100)
POTASSIUM SERPL-SCNC: 3.8 MMOL/L (ref 3.5–5.1)
PROT SERPL-MCNC: 7.1 G/DL (ref 6.4–8.2)
SODIUM SERPL-SCNC: 141 MMOL/L (ref 136–145)
SPECIMEN HOLD: NORMAL
TRIGL SERPL-MCNC: 157 MG/DL
TSH SERPL DL<=0.05 MIU/L-ACNC: 0.42 UIU/ML (ref 0.36–3.74)
VLDLC SERPL CALC-MCNC: 31.4 MG/DL

## 2023-07-13 NOTE — PROGRESS NOTES
PA form submitted electronic for Ozempic (0.25 or 0.5 MG/DOSE) 2MG/3ML pen-injectors,thru,[facility]. portal.[facility],called at [phone],[facility] do not fax forms to office only done on line. Please follow up as needed when decision is fax to office.

## 2023-07-17 DIAGNOSIS — R73.9 ELEVATED BLOOD SUGAR: Primary | ICD-10-CM

## 2023-07-27 ENCOUNTER — NURSE ONLY (OUTPATIENT)
Age: 43
End: 2023-07-27
Payer: COMMERCIAL

## 2023-07-27 ENCOUNTER — OFFICE VISIT (OUTPATIENT)
Age: 43
End: 2023-07-27
Payer: COMMERCIAL

## 2023-07-27 ENCOUNTER — HOSPITAL ENCOUNTER (OUTPATIENT)
Facility: HOSPITAL | Age: 43
Discharge: HOME | End: 2023-07-27
Payer: COMMERCIAL

## 2023-07-27 VITALS
RESPIRATION RATE: 20 BRPM | HEIGHT: 71 IN | OXYGEN SATURATION: 97 % | WEIGHT: 303 LBS | BODY MASS INDEX: 42.42 KG/M2 | HEART RATE: 82 BPM | SYSTOLIC BLOOD PRESSURE: 138 MMHG | DIASTOLIC BLOOD PRESSURE: 88 MMHG | TEMPERATURE: 97.5 F

## 2023-07-27 DIAGNOSIS — R73.9 ELEVATED BLOOD SUGAR: ICD-10-CM

## 2023-07-27 DIAGNOSIS — M25.551 RIGHT HIP PAIN: ICD-10-CM

## 2023-07-27 DIAGNOSIS — E66.01 CLASS 3 SEVERE OBESITY DUE TO EXCESS CALORIES WITH SERIOUS COMORBIDITY AND BODY MASS INDEX (BMI) OF 40.0 TO 44.9 IN ADULT (HCC): ICD-10-CM

## 2023-07-27 DIAGNOSIS — K43.9 VENTRAL HERNIA WITHOUT OBSTRUCTION OR GANGRENE: Primary | ICD-10-CM

## 2023-07-27 PROCEDURE — 99203 OFFICE O/P NEW LOW 30 MIN: CPT | Performed by: SURGERY

## 2023-07-27 PROCEDURE — 73502 X-RAY EXAM HIP UNI 2-3 VIEWS: CPT

## 2023-07-27 PROCEDURE — 3075F SYST BP GE 130 - 139MM HG: CPT | Performed by: SURGERY

## 2023-07-27 PROCEDURE — 3079F DIAST BP 80-89 MM HG: CPT | Performed by: SURGERY

## 2023-07-27 RX ORDER — HYDRALAZINE HYDROCHLORIDE 50 MG/1
TABLET, FILM COATED ORAL
COMMUNITY
Start: 2023-07-12

## 2023-07-27 RX ORDER — DILTIAZEM HYDROCHLORIDE 360 MG/1
CAPSULE, EXTENDED RELEASE ORAL
COMMUNITY
Start: 2023-07-12

## 2023-07-27 RX ORDER — FUROSEMIDE 40 MG/1
TABLET ORAL
COMMUNITY
Start: 2023-07-12

## 2023-07-27 NOTE — PROGRESS NOTES
Subjective:       Natalya Salguero is a 36 y.o. male who presents for evaluation of ventral hernia. He has had it for about a year. Its been bothering him for a while. He has had it pop out once or twice and had to go to the ER for it to be reduced. Currently eating ok. Having bowel function. Patient has CAD and CHF - Dr. Mariaelena Ann. Smokes 1 ppd. Drinks ETOH on weekends. Past Medical History:   Diagnosis Date    Acute renal failure ([facility]) 3/31/2020    CAD (coronary artery disease)     Chronic diastolic heart failure, NYHA class 2 ([address] ) 4/1/2020    Elevated BP     Essential hypertension     Family history of early CAD 3/31/2020    Myocardial infarction [facility])     Severe left ventricular hypertrophy 4/1/2020    Tobacco abuse      Past Surgical History:   Procedure Laterality Date    ORTHOPEDIC SURGERY Left 1997    ankle     Social History     Socioeconomic History    Marital status:      Spouse name: None    Number of children: None    Years of education: None    Highest education level: None   Tobacco Use    Smoking status: Every Day     Packs/day: 0.50     Types: Cigarettes     Start date: 4/3/2000    Smokeless tobacco: Never   Vaping Use    Vaping Use: Never used   Substance and Sexual Activity    Alcohol use: Yes     Alcohol/week: 4.0 standard drinks    Drug use: No   Social History Narrative    Lives with Fiance. 3 kids 25, 12, and 6.  3 stepkids 18,11, and 7. [employer].      Social Determinants of Health     Financial Resource Strain: Low Risk     Difficulty of Paying Living Expenses: Not hard at all   Food Insecurity: No Food Insecurity    Worried About [place] in the Last Year: Never true    Ran Out of Food in the Last Year: Never true   Transportation Needs: Unknown    Lack of Transportation (Non-Medical): No   Housing Stability: Unknown    Unstable Housing in the Last Year: No     Current Outpatient Medications   Medication Sig Dispense Refill    furosemide (LASIX) 40 MG tablet

## 2023-07-27 NOTE — PROGRESS NOTES
Identified pt with two pt identifiers(name and ). Reviewed record in preparation for visit and have obtained necessary documentation. All patient medications has been reviewed. Chief Complaint   Patient presents with    Hernia     Seen at the request of Dr. Carolynn Perla, eval umbilical hernia. Health Maintenance Due   Topic    COVID-19 Vaccine (1)    DTaP/Tdap/Td vaccine (1 - Tdap)    A1C test (Diabetic or Prediabetic)        [unfilled]    4. Have you been to the ER, urgent care clinic since your last visit? Hospitalized since your last visit?no    5. Have you seen or consulted any other health care providers outside of the [address] [place] [address] since your last visit? Include any pap smears or colon screening. no      Patient is accompanied by self I have received verbal consent from Larry Jameson to discuss any/all medical information while they are present in the room.

## 2023-07-28 LAB
BASOPHILS # BLD: 0.1 K/UL (ref 0–0.1)
BASOPHILS NFR BLD: 1 % (ref 0–1)
DIFFERENTIAL METHOD BLD: ABNORMAL
EOSINOPHIL # BLD: 0.3 K/UL (ref 0–0.4)
EOSINOPHIL NFR BLD: 3 % (ref 0–7)
ERYTHROCYTE [DISTWIDTH] IN BLOOD BY AUTOMATED COUNT: 14.6 % (ref 11.5–14.5)
EST. AVERAGE GLUCOSE BLD GHB EST-MCNC: 117 MG/DL
HBA1C MFR BLD: 5.7 % (ref 4–5.6)
HCT VFR BLD AUTO: 47 % (ref 36.6–50.3)
HGB BLD-MCNC: 14.9 G/DL (ref 12.1–17)
IMM GRANULOCYTES # BLD AUTO: 0.1 K/UL (ref 0–0.04)
IMM GRANULOCYTES NFR BLD AUTO: 1 % (ref 0–0.5)
LYMPHOCYTES # BLD: 2.2 K/UL (ref 0.8–3.5)
LYMPHOCYTES NFR BLD: 26 % (ref 12–49)
MCH RBC QN AUTO: 28.8 PG (ref 26–34)
MCHC RBC AUTO-ENTMCNC: 31.7 G/DL (ref 30–36.5)
MCV RBC AUTO: 90.9 FL (ref 80–99)
MONOCYTES # BLD: 1 K/UL (ref 0–1)
MONOCYTES NFR BLD: 12 % (ref 5–13)
NEUTS SEG # BLD: 4.7 K/UL (ref 1.8–8)
NEUTS SEG NFR BLD: 57 % (ref 32–75)
NRBC # BLD: 0 K/UL (ref 0–0.01)
NRBC BLD-RTO: 0 PER 100 WBC
PLATELET # BLD AUTO: ABNORMAL K/UL (ref 150–400)
RBC # BLD AUTO: 5.17 M/UL (ref 4.1–5.7)
WBC # BLD AUTO: 8.3 K/UL (ref 4.1–11.1)

## 2023-08-02 ENCOUNTER — TELEPHONE (OUTPATIENT)
Age: 43
End: 2023-08-02

## 2023-08-02 NOTE — TELEPHONE ENCOUNTER
Joy COHN called at [phone],ESQ status on Ozempic (0.25 or 0.5 MG/DOSE) 2MG/3ML pen-injectors,Kelli stated claim was denied on 7/14/23,due to diagnosis provided,diagnosis must be Type 2 diabetes with step therapy done,per plan criteria. Please send cancellation to pharmacy to avoid repeats of PA,if patient do not meet plans criteria. Please note Thanks

## 2023-10-31 ENCOUNTER — TELEPHONE (OUTPATIENT)
Age: 43
End: 2023-10-31

## 2023-10-31 NOTE — TELEPHONE ENCOUNTER
Spoke with pt and the Surgeon you referred him to told him he needs to lose at least 30lbs before they can do surgery. Pt wants to know what to do now?

## 2023-10-31 NOTE — TELEPHONE ENCOUNTER
----- Message from Yohan Powell sent at 10/31/2023  1:09 PM EDT -----  Subject: Message to Provider    QUESTIONS  Information for Provider? Patient stated he would like to get a call back   from provider Pan. He went and seen a surgeon about his hernia and that   surgeon is unable to do anything for him and he would like to know what   his next step is due to the hernia getting worse. Please call  ---------------------------------------------------------------------------  --------------  King Mendez INFO  [phone]; OK to leave message on voicemail  ---------------------------------------------------------------------------  --------------  SCRIPT ANSWERS  Relationship to Patient?  Self

## 2023-11-01 NOTE — TELEPHONE ENCOUNTER
Please advise pt that I would recommend working on diet and exercise in order to achieve recommended weight loss. Low carb diet , potentially with intermittent fasting.

## (undated) DEVICE — TR BAND RADIAL ARTERY COMPRESSION DEVICE: Brand: TR BAND

## (undated) DEVICE — SYR POWER 150ML 8IN FILL TUBE --

## (undated) DEVICE — GUIDEWIRE VASC L260CM 0.035IN J TIP L3MM PTFE FIX COR NAMIC

## (undated) DEVICE — CATHETER ETER ANGIO L110CM OD5FR ID046IN L75CM 038IN 145DEG CARD

## (undated) DEVICE — GLIDESHEATH SLENDER ACCESS KIT: Brand: GLIDESHEATH SLENDER

## (undated) DEVICE — KIT ANGIOGRAPHY CUST MRMC

## (undated) DEVICE — PACK PROCEDURE SURG HRT CATH

## (undated) DEVICE — CATHETER ANGIO JR4 AD 5 FRX100 CM 25 CM PERFORMA

## (undated) DEVICE — CATH GUID COR EB35 5FR 100CM -- LAUNCHER

## (undated) DEVICE — 3M™ TEGADERM™ TRANSPARENT FILM DRESSING FRAME STYLE, 1626W, 4 IN X 4-3/4 IN (10 CM X 12 CM), 50/CT 4CT/CASE: Brand: 3M™ TEGADERM™

## (undated) DEVICE — SPLINT WR POS F/ARTERIAL ACC -- BX/10

## (undated) DEVICE — RADIFOCUS OPTITORQUE ANGIOGRAPHIC CATHETER: Brand: OPTITORQUE